# Patient Record
Sex: FEMALE | Race: BLACK OR AFRICAN AMERICAN | NOT HISPANIC OR LATINO | Employment: OTHER | URBAN - METROPOLITAN AREA
[De-identification: names, ages, dates, MRNs, and addresses within clinical notes are randomized per-mention and may not be internally consistent; named-entity substitution may affect disease eponyms.]

---

## 2017-02-09 ENCOUNTER — HOSPITAL ENCOUNTER (EMERGENCY)
Facility: HOSPITAL | Age: 42
Discharge: HOME | End: 2017-02-09
Attending: EMERGENCY MEDICINE
Payer: MEDICAID

## 2017-02-09 VITALS
OXYGEN SATURATION: 98 % | HEIGHT: 64 IN | RESPIRATION RATE: 12 BRPM | WEIGHT: 192 LBS | SYSTOLIC BLOOD PRESSURE: 136 MMHG | TEMPERATURE: 99 F | BODY MASS INDEX: 32.78 KG/M2 | DIASTOLIC BLOOD PRESSURE: 72 MMHG | HEART RATE: 100 BPM

## 2017-02-09 DIAGNOSIS — J06.9 UPPER RESPIRATORY INFECTION, ACUTE: ICD-10-CM

## 2017-02-09 DIAGNOSIS — R50.9 ACUTE FEBRILE ILLNESS: Primary | ICD-10-CM

## 2017-02-09 LAB
B-HCG UR QL: NEGATIVE
CTP QC/QA: YES
DEPRECATED S PYO AG THROAT QL EIA: NEGATIVE
FLUAV AG SPEC QL IA: NEGATIVE
FLUBV AG SPEC QL IA: NEGATIVE
SPECIMEN SOURCE: NORMAL

## 2017-02-09 PROCEDURE — 99283 EMERGENCY DEPT VISIT LOW MDM: CPT | Mod: 25

## 2017-02-09 PROCEDURE — 87081 CULTURE SCREEN ONLY: CPT

## 2017-02-09 PROCEDURE — 87880 STREP A ASSAY W/OPTIC: CPT

## 2017-02-09 PROCEDURE — 81025 URINE PREGNANCY TEST: CPT | Performed by: PHYSICIAN ASSISTANT

## 2017-02-09 PROCEDURE — 87400 INFLUENZA A/B EACH AG IA: CPT | Mod: 59

## 2017-02-09 PROCEDURE — 25000003 PHARM REV CODE 250: Performed by: PHYSICIAN ASSISTANT

## 2017-02-09 RX ORDER — ONDANSETRON 4 MG/1
4 TABLET, ORALLY DISINTEGRATING ORAL
Status: COMPLETED | OUTPATIENT
Start: 2017-02-09 | End: 2017-02-09

## 2017-02-09 RX ORDER — GUAIFENESIN/DEXTROMETHORPHAN 100-10MG/5
5 SYRUP ORAL 4 TIMES DAILY PRN
Qty: 120 ML | Refills: 0 | Status: SHIPPED | OUTPATIENT
Start: 2017-02-09 | End: 2017-02-19

## 2017-02-09 RX ORDER — IBUPROFEN 600 MG/1
600 TABLET ORAL
Status: COMPLETED | OUTPATIENT
Start: 2017-02-09 | End: 2017-02-09

## 2017-02-09 RX ADMIN — IBUPROFEN 600 MG: 600 TABLET, FILM COATED ORAL at 07:02

## 2017-02-09 RX ADMIN — ONDANSETRON 4 MG: 4 TABLET, ORALLY DISINTEGRATING ORAL at 07:02

## 2017-02-09 NOTE — ED AVS SNAPSHOT
[facility] [address] [place] [address]               Joie Kelly   2017  6:28 PM   ED    Description:  Female : 1975   Department:  [facility]           Your Care was Coordinated By:     Provider Role From To    Travis Sapp MD Attending Provider 17 1744 --    Maryann Archuleta PA-C Physician Assistant 17 [phone] --      Reason for Visit     Generalized Body Aches           Diagnoses this Visit        Comments    Acute febrile illness    -  Primary     Upper respiratory infection, acute           ED Disposition     ED Disposition Condition Comment    Discharge             To Do List           Follow-up Information     Follow up with Terry Art Iii, MD In 1 week.    Specialty:  Family Medicine    Contact information:    [address]  [facility] [address]  [phone]          Follow up with [facility].    Specialty:  Emergency Medicine    Why:  If symptoms worsen    Contact information:    [address] [place] [id number] [phone]       These Medications        Disp Refills Start End    dextromethorphan-guaifenesin  mg/5 ml (ROBITUSSIN-DM)  mg/5 mL liquid 120 mL 0 2017    Take 5 mLs by mouth 4 (four) times daily as needed (cough/congestion). - Oral      [facility]     [facility] On [facility] Nurse Care Line -  Assistance  Registered nurses in the [facility] provide clinical advisement, health education, appointment booking, and other advisory services.  Call for this free service at [phone].             Medications           Message regarding Medications     Verify the changes and/or additions to your medication regime listed below are the same as discussed with your clinician today.  If any of these changes or additions are incorrect, please notify your healthcare provider.        START  "taking these NEW medications        Refills    dextromethorphan-guaifenesin  mg/5 ml (ROBITUSSIN-DM)  mg/5 mL liquid 0    Sig: Take 5 mLs by mouth 4 (four) times daily as needed (cough/congestion).    Class: Print    Route: Oral      These medications were administered today        Dose Freq    ibuprofen tablet 600 mg 600 mg ED 1 Time    Sig: Take 1 tablet (600 mg total) by mouth ED 1 Time.    Class: Normal    Route: Oral    ondansetron disintegrating tablet 4 mg 4 mg ED 1 Time    Sig: Take 1 tablet (4 mg total) by mouth ED 1 Time.    Class: Normal    Route: Oral      STOP taking these medications     butalbital-acetaminophen-caffeine -40 mg (FIORICET, ESGIC) -40 mg per tablet Take 1 tablet by mouth every 4 (four) hours as needed for Headaches.           Verify that the below list of medications is an accurate representation of the medications you are currently taking.  If none reported, the list may be blank. If incorrect, please contact your healthcare provider. Carry this list with you in case of emergency.           Current Medications     norethindrone-ethinyl estradiol (ZENCHENT, 28,) 0.4-35 mg-mcg per tablet Take 1 tablet by mouth once daily.    dextromethorphan-guaifenesin  mg/5 ml (ROBITUSSIN-DM)  mg/5 mL liquid Take 5 mLs by mouth 4 (four) times daily as needed (cough/congestion).    hydrocortisone 1 % cream Apply to affected area 2 times daily           Clinical Reference Information           Your Vitals Were     BP Pulse Temp Resp Height Weight    136/72 (BP Location: Right arm, Patient Position: Sitting) 100 102.4 °F (39.1 °C) 12 5' 4" (1.626 m) 87.1 kg (192 lb)    Last Period SpO2 BMI          02/08/2017 98% 32.96 kg/m2        Allergies as of 2/9/2017     No Known Allergies      Immunizations Administered on Date of Encounter - 2/9/2017     None      ED Micro, Lab, POCT     Start Ordered       Status Ordering Provider    02/09/17 1840 02/09/17 1839  Influenza " antigen Nasopharyngeal Swab  STAT      Final result     02/09/17 1840 02/09/17 1839  Rapid strep screen  STAT      Final result     02/09/17 1840 02/09/17 1839  POCT urine pregnancy  Once      Final result     02/09/17 1839 02/09/17 1839  Strep A culture, throat  Once      In process       ED Imaging Orders     None      Discharge References/Attachments     URI, VIRAL, NO ABX (ADULT) (ENGLISH)       [facility] complies with applicable Federal civil rights laws and does not discriminate on the basis of race, color, national origin, age, disability, or sex.        Language Assistance Services     ATTENTION: Language assistance services are available, free of charge. Please call [phone].      ATENCIÓN: Si habla español, tiene a sanders disposición servicios gratuitos de asistencia lingüística. Llame al [phone].     CHÚ Ý: N?u b?n nói Ti?ng Vi?t, có các d?ch v? h? tr? ngôn ng? mi?n phí dành cho b?n. G?i s? [phone].

## 2017-02-10 NOTE — ED PROVIDER NOTES
Encounter Date: 2017       History     Chief Complaint   Patient presents with    Generalized Body Aches     since this am     Review of patient's allergies indicates:  No Known Allergies  HPI Comments: Patient is a 41 year old female with complaint of fever. She reports sudden onset of fever today. She reports taking tylenol this AM. She reports three episodes of vomiting. She reports associated generalized body aches, rhinorrhea, non-productive cough and headache. She denied recent sick contact. She reports headache but denies neck pain or stiffness. She denied abdominal pain, shortness of breath, dysuria, urinary frequency or sore throat.     The history is provided by the patient.     Past Medical History   Diagnosis Date    History of       No past medical history pertinent negatives.  Past Surgical History   Procedure Laterality Date    Tubal ligation       History reviewed. No pertinent family history.  Social History   Substance Use Topics    Smoking status: Never Smoker    Smokeless tobacco: None    Alcohol use No     Review of Systems   Constitutional: Positive for chills and fever. Negative for appetite change.   HENT: Positive for congestion and rhinorrhea. Negative for sore throat.    Respiratory: Positive for cough. Negative for shortness of breath and wheezing.    Cardiovascular: Negative for chest pain.   Gastrointestinal: Positive for nausea and vomiting. Negative for abdominal pain and diarrhea.   Genitourinary: Negative for dysuria and frequency.   Musculoskeletal: Negative for back pain, neck pain and neck stiffness.   Skin: Negative for rash.   Neurological: Positive for headaches. Negative for weakness.   Hematological: Does not bruise/bleed easily.       Physical Exam   Initial Vitals   BP Pulse Resp Temp SpO2   17 1825 17 1825 17 1825 17 1825 17 1825   136/72 122 12 102.4 °F (39.1 °C) 98 %     Physical Exam    Nursing note and vitals  reviewed.  Constitutional: She appears well-developed and well-nourished. No distress.   HENT:   Head: Normocephalic and atraumatic.   Right Ear: Tympanic membrane, external ear and ear canal normal.   Left Ear: Tympanic membrane, external ear and ear canal normal.   Nose: Nose normal.   Mouth/Throat: Uvula is midline and mucous membranes are normal. Posterior oropharyngeal erythema present. No oropharyngeal exudate.   Eyes: Conjunctivae are normal. Pupils are equal, round, and reactive to light. Right eye exhibits no discharge. Left eye exhibits no discharge.   Neck: Normal range of motion. Neck supple.   Cardiovascular: Normal rate, regular rhythm and normal heart sounds. Exam reveals no gallop and no friction rub.    No murmur heard.  Pulmonary/Chest: Effort normal and breath sounds normal. She has no decreased breath sounds. She has no wheezes. She has no rhonchi. She has no rales.   Abdominal: Soft. Bowel sounds are normal. There is no tenderness. There is no guarding.   Musculoskeletal: Normal range of motion.   Neurological: She is alert.   Skin: Skin is warm and dry.         ED Course   Procedures  Labs Reviewed   THROAT SCREEN, RAPID   CULTURE, STREP A,  THROAT   INFLUENZA A AND B ANTIGEN   POCT URINE PREGNANCY             Medical Decision Making:   History:   Old Medical Records: I decided to obtain old medical records.  Clinical Tests:   Lab Tests: Ordered       APC / Resident Notes:   This is an emergent evaluation with 41 no female with complaint of generalized bodyaches, fever, vomiting and rhinorrhea. Vital signs reviewed.  Abdomen is soft and nontender.  There is no rebound, rigidity or distention.  I doubt intra-abdominal process.  Bilateral TMs with no erythema, retraction or perforation.  There is no mastoid tenderness.  There is no movement tenderness to bilateral ears.  No tonsillar swelling or exudate noted.  Uvula is midline.  No concern for ludwigs angina. Breath sounds are clear and equal  bilaterally. Patient was given a dose of ibuprofen with improvement in fever and tachycardia. She does report headache which improved with ibuprofen. She denied nuchal rigidity or neck stiffness. She has full passive ROM to neck. Strep and influenza are negative. Suspect symptoms are secondary to viral illness.  Symptomatic treatment. Discussed results with patient. Return precautions given. Patient is to follow up with their primary care provider. Case was discussed with Dr. Sapp who is in agreement with the plan of care. All questions answered.            Attending Attestation:     Physician Attestation Statement for NP/PA:   I discussed this assessment and plan of this patient with the NP/PA, but I did not personally examine the patient. The face to face encounter was performed by the NP/PA.    Other NP/PA Attestation Additions:    History of Present Illness: 41-year-old female presented with a chief complaint of fever.    Medical Decision Making: Initial differential diagnosis included but not limited to pharyngitis, influenza, and upper respiratory infection.  I am in agreement with the physician assistant's  assessment, treatment, and plan of care.                 ED Course     Clinical Impression:   The primary encounter diagnosis was Acute febrile illness. A diagnosis of Upper respiratory infection, acute was also pertinent to this visit.    Disposition:   Disposition: Discharged  Condition: Stable       Maryann Archuleta PA-C  02/09/17 2029       Travis Sapp MD  02/09/17 2142

## 2017-02-12 LAB — BACTERIA THROAT CULT: NORMAL

## 2017-12-27 ENCOUNTER — HOSPITAL ENCOUNTER (EMERGENCY)
Facility: HOSPITAL | Age: 42
Discharge: HOME | End: 2017-12-27
Attending: EMERGENCY MEDICINE
Payer: MEDICAID

## 2017-12-27 VITALS
RESPIRATION RATE: 12 BRPM | SYSTOLIC BLOOD PRESSURE: 122 MMHG | TEMPERATURE: 99 F | BODY MASS INDEX: 31.16 KG/M2 | DIASTOLIC BLOOD PRESSURE: 63 MMHG | HEIGHT: 65 IN | WEIGHT: 187 LBS | HEART RATE: 80 BPM | OXYGEN SATURATION: 99 %

## 2017-12-27 DIAGNOSIS — B34.9 VIRAL SYNDROME: Primary | ICD-10-CM

## 2017-12-27 DIAGNOSIS — R11.2 NON-INTRACTABLE VOMITING WITH NAUSEA, UNSPECIFIED VOMITING TYPE: ICD-10-CM

## 2017-12-27 LAB
B-HCG UR QL: NEGATIVE
CTP QC/QA: YES
FLUAV AG SPEC QL IA: NEGATIVE
FLUBV AG SPEC QL IA: NEGATIVE
SPECIMEN SOURCE: NORMAL

## 2017-12-27 PROCEDURE — 99283 EMERGENCY DEPT VISIT LOW MDM: CPT | Mod: 25

## 2017-12-27 PROCEDURE — 25000003 PHARM REV CODE 250: Performed by: NURSE PRACTITIONER

## 2017-12-27 PROCEDURE — 81025 URINE PREGNANCY TEST: CPT | Performed by: NURSE PRACTITIONER

## 2017-12-27 PROCEDURE — 87400 INFLUENZA A/B EACH AG IA: CPT

## 2017-12-27 RX ORDER — ONDANSETRON 4 MG/1
4 TABLET, ORALLY DISINTEGRATING ORAL
Status: COMPLETED | OUTPATIENT
Start: 2017-12-27 | End: 2017-12-27

## 2017-12-27 RX ORDER — ONDANSETRON 4 MG/1
4 TABLET, ORALLY DISINTEGRATING ORAL EVERY 8 HOURS PRN
Qty: 12 TABLET | Refills: 0 | Status: SHIPPED | OUTPATIENT
Start: 2017-12-27 | End: 2021-09-07 | Stop reason: CLARIF

## 2017-12-27 RX ADMIN — ONDANSETRON 4 MG: 4 TABLET, ORALLY DISINTEGRATING ORAL at 04:12

## 2017-12-27 NOTE — ED PROVIDER NOTES
Encounter Date: 2017    SCRIBE #1 NOTE: I, Alla Julien, am scribing for, and in the presence of, HUI Tidwell.       History     Chief Complaint   Patient presents with    Emesis     headache, body aches x 3 days.     2017  4:18 PM     Chief Complaint: Emesis      Joie Kelly is a 42 y.o. female presenting to the E.D. with complaints of non bloody emesis which began three days ago. Pt states that she has been unable to tolerate PO intake. Two episodes of emesis thus far today. Associated symptoms include Cough, congestion, rhinorrhea, generalized body aches, and headache. No fever or abdominal pain.       The history is provided by the patient.     Review of patient's allergies indicates:  No Known Allergies  Past Medical History:   Diagnosis Date    History of       Past Surgical History:   Procedure Laterality Date    TUBAL LIGATION       History reviewed. No pertinent family history.  Social History   Substance Use Topics    Smoking status: Never Smoker    Smokeless tobacco: Not on file    Alcohol use No     Review of Systems   Constitutional: Negative for fever.   HENT: Positive for congestion and rhinorrhea. Negative for sore throat.    Eyes: Negative for redness and visual disturbance.   Respiratory: Positive for cough.    Cardiovascular: Negative for chest pain.   Gastrointestinal: Positive for vomiting. Negative for abdominal pain, diarrhea and nausea.   Genitourinary: Negative for difficulty urinating and pelvic pain.   Musculoskeletal: Positive for myalgias. Negative for arthralgias.   Skin: Negative for rash.   Neurological: Positive for headaches. Negative for weakness.       Physical Exam     Initial Vitals [17 1542]   BP Pulse Resp Temp SpO2   122/63 80 12 99.4 °F (37.4 °C) 99 %      MAP       82.67         Physical Exam    Nursing note and vitals reviewed.  Constitutional: She appears well-developed.   HENT:   Head: Normocephalic and atraumatic.    Mouth/Throat: Oropharynx is clear and moist.   Eyes: Conjunctivae are normal.   Neck: Neck supple.   Cardiovascular: Normal rate, regular rhythm, normal heart sounds and intact distal pulses. Exam reveals no gallop and no friction rub.    No murmur heard.  Pulmonary/Chest: Breath sounds normal. She has no wheezes. She has no rhonchi. She has no rales.   Abdominal: Soft. She exhibits no distension. There is no tenderness.   Musculoskeletal: Normal range of motion.   Neurological: She is alert and oriented to person, place, and time.   Skin: No rash noted. No erythema.   Psychiatric: She has a normal mood and affect.         ED Course   Procedures  Labs Reviewed - No data to display                 APC / Resident Notes:   Joie Kelly is a 42 year old female presenting to the ED with c/o emesis and upper respiratory symptoms. She had no abdominal tenderness on exam and was able to tolerate PO intake after receiving oral Zofran. She had no episodes of emesis while in the ED. Lungs clear to auscultation and I do not suspect pneumonia. Symptoms most consistent with a viral syndrome. I do not suspect emergent intraabdominal cause of her vomiting such as appendicitis, SBO, viscus perforation and do not think imaging or labs are necessary. I discussed specific return precautions with the patient and she verbalized understanding. Based on my clinical evaluation, I do not appreciate any immediate, emergent, or life threatening condition or etiology that warrants additional workup today and feel that the patient can be discharged with close follow up care.          Scribe Attestation:   Scribe #1: I performed the above scribed service and the documentation accurately describes the services I performed. I attest to the accuracy of the note.    I, KRISTA ParikhC, personally performed the services described in this documentation. All medical record entries made by the scribe were at my direction and in my presence.   I have reviewed the chart and agree that the record reflects my personal performance and is accurate and complete. HUI Parikh.  6:11 PM 12/27/2017          ED Course      Clinical Impression:   The primary encounter diagnosis was Viral syndrome. A diagnosis of Non-intractable vomiting with nausea, unspecified vomiting type was also pertinent to this visit.    Disposition:   Disposition: Discharged  Condition: Stable                        Sarah Pitt NP  12/27/17 4614

## 2017-12-29 ENCOUNTER — HOSPITAL ENCOUNTER (EMERGENCY)
Facility: HOSPITAL | Age: 42
Discharge: HOME | End: 2017-12-29
Attending: EMERGENCY MEDICINE
Payer: MEDICAID

## 2017-12-29 VITALS
SYSTOLIC BLOOD PRESSURE: 133 MMHG | TEMPERATURE: 99 F | HEIGHT: 65 IN | RESPIRATION RATE: 18 BRPM | OXYGEN SATURATION: 99 % | DIASTOLIC BLOOD PRESSURE: 76 MMHG | WEIGHT: 187 LBS | BODY MASS INDEX: 31.16 KG/M2 | HEART RATE: 70 BPM

## 2017-12-29 DIAGNOSIS — R07.9 CHEST PAIN: ICD-10-CM

## 2017-12-29 DIAGNOSIS — R07.89 CHEST DISCOMFORT: ICD-10-CM

## 2017-12-29 LAB
ALBUMIN SERPL BCP-MCNC: 3.6 G/DL
ALP SERPL-CCNC: 57 U/L
ALT SERPL W/O P-5'-P-CCNC: 10 U/L
ANION GAP SERPL CALC-SCNC: 9 MMOL/L
AST SERPL-CCNC: 12 U/L
BASOPHILS # BLD AUTO: 0 K/UL
BASOPHILS NFR BLD: 0.5 %
BILIRUB SERPL-MCNC: 0.1 MG/DL
BUN SERPL-MCNC: 7 MG/DL
CALCIUM SERPL-MCNC: 9.3 MG/DL
CHLORIDE SERPL-SCNC: 109 MMOL/L
CO2 SERPL-SCNC: 26 MMOL/L
CREAT SERPL-MCNC: 0.7 MG/DL
DIFFERENTIAL METHOD: ABNORMAL
EOSINOPHIL # BLD AUTO: 0.3 K/UL
EOSINOPHIL NFR BLD: 3.6 %
ERYTHROCYTE [DISTWIDTH] IN BLOOD BY AUTOMATED COUNT: 18.2 %
EST. GFR  (AFRICAN AMERICAN): >60 ML/MIN/1.73 M^2
EST. GFR  (NON AFRICAN AMERICAN): >60 ML/MIN/1.73 M^2
FLUAV AG SPEC QL IA: NEGATIVE
FLUBV AG SPEC QL IA: NEGATIVE
GLUCOSE SERPL-MCNC: 93 MG/DL
HCT VFR BLD AUTO: 26.6 %
HGB BLD-MCNC: 8.3 G/DL
LYMPHOCYTES # BLD AUTO: 2.7 K/UL
LYMPHOCYTES NFR BLD: 35.7 %
MCH RBC QN AUTO: 22.5 PG
MCHC RBC AUTO-ENTMCNC: 31.3 G/DL
MCV RBC AUTO: 72 FL
MONOCYTES # BLD AUTO: 0.6 K/UL
MONOCYTES NFR BLD: 7.8 %
NEUTROPHILS # BLD AUTO: 4 K/UL
NEUTROPHILS NFR BLD: 52.4 %
PLATELET # BLD AUTO: 450 K/UL
PMV BLD AUTO: 7.6 FL
POTASSIUM SERPL-SCNC: 3.8 MMOL/L
PROT SERPL-MCNC: 7.8 G/DL
RBC # BLD AUTO: 3.69 M/UL
SODIUM SERPL-SCNC: 144 MMOL/L
SPECIMEN SOURCE: NORMAL
TROPONIN I SERPL DL<=0.01 NG/ML-MCNC: 0.01 NG/ML
WBC # BLD AUTO: 7.7 K/UL

## 2017-12-29 PROCEDURE — 84484 ASSAY OF TROPONIN QUANT: CPT

## 2017-12-29 PROCEDURE — 80053 COMPREHEN METABOLIC PANEL: CPT

## 2017-12-29 PROCEDURE — 99284 EMERGENCY DEPT VISIT MOD MDM: CPT

## 2017-12-29 PROCEDURE — 93005 ELECTROCARDIOGRAM TRACING: CPT

## 2017-12-29 PROCEDURE — 85025 COMPLETE CBC W/AUTO DIFF WBC: CPT

## 2017-12-29 PROCEDURE — 25000003 PHARM REV CODE 250: Performed by: EMERGENCY MEDICINE

## 2017-12-29 PROCEDURE — 36415 COLL VENOUS BLD VENIPUNCTURE: CPT

## 2017-12-29 PROCEDURE — 87400 INFLUENZA A/B EACH AG IA: CPT | Mod: 59

## 2017-12-29 RX ORDER — ASPIRIN 325 MG
325 TABLET ORAL
Status: COMPLETED | OUTPATIENT
Start: 2017-12-29 | End: 2017-12-29

## 2017-12-29 RX ORDER — NAPROXEN 500 MG/1
500 TABLET ORAL 2 TIMES DAILY WITH MEALS
Qty: 14 TABLET | Refills: 0 | Status: SHIPPED | OUTPATIENT
Start: 2017-12-29 | End: 2018-01-05

## 2017-12-29 RX ADMIN — ASPIRIN 325 MG ORAL TABLET 325 MG: 325 PILL ORAL at 08:12

## 2017-12-30 NOTE — ED NOTES
Pt presents to ED with c/o right chest wall pain that began one day ago. She also reports fever and cough. Pt is AAOx4. Skin warm, dry to touch. Respirations even, nonlabored. NAD noted. VSS. Breath sounds clear, equal bilaterally. Mucous membranes pink and moist. The pt had an EKG done in triage that showed NSR with no ectopy.

## 2017-12-30 NOTE — ED PROVIDER NOTES
Encounter Date: 2017    SCRIBE #1 NOTE: I, Kianna Cifuentes , am scribing for, and in the presence of, Dr. Avalos .       History     Chief Complaint   Patient presents with    Fever    Chest Pain    Cough     2017  8:19 PM     Chief Complaint: CP      The patient is a 42 y.o. female who is presenting with the gradual onset of R sided CP that began x 1 day ago. The pt reports that the pain is achy and radiates from the anterior chest wall underneath her R breast and to her back. She notes that the pain is exacerbated with coughing and palpation. No exacerbating or mitigating factors. No treatments attempted. Associated sx include fever that have since resolved as well as persistent nausea. No other comments or complaints. No pertinent PMHx, no pertinent past surgical hx.         The history is provided by the patient and medical records.     Review of patient's allergies indicates:  No Known Allergies  Past Medical History:   Diagnosis Date    History of       Past Surgical History:   Procedure Laterality Date    TUBAL LIGATION       History reviewed. No pertinent family history.  Social History   Substance Use Topics    Smoking status: Never Smoker    Smokeless tobacco: Not on file    Alcohol use No     Review of Systems   Constitutional: Positive for fever (resolved ).   HENT: Negative for sore throat.    Eyes: Negative for visual disturbance.   Respiratory: Negative for shortness of breath.    Cardiovascular: Positive for chest pain.   Gastrointestinal: Positive for nausea.   Genitourinary: Negative for dysuria.   Musculoskeletal: Negative for back pain.   Skin: Negative for rash.   Neurological: Negative for weakness.   Hematological: Does not bruise/bleed easily.       Physical Exam     Initial Vitals [17 1710]   BP Pulse Resp Temp SpO2   (!) 143/73 73 20 99 °F (37.2 °C) 100 %      MAP       96.33         Physical Exam    Nursing note and vitals reviewed.  Constitutional: She  appears well-nourished.   HENT:   Head: Normocephalic and atraumatic.   Eyes: Conjunctivae and EOM are normal.   Neck: Normal range of motion. Neck supple. No thyroid mass present.   Cardiovascular: Normal rate and regular rhythm.   Abdominal: Soft. Normal appearance and bowel sounds are normal. There is no tenderness.   Musculoskeletal: She exhibits tenderness.   Reproducible chest wall tenderness to the R upper anterior chest wall.    Neurological: She is alert and oriented to person, place, and time. She has normal strength. No cranial nerve deficit or sensory deficit.   Skin: Skin is warm and dry. No rash noted. No erythema.   Psychiatric: She has a normal mood and affect. Her speech is normal. Cognition and memory are normal.         ED Course   Procedures  Labs Reviewed   INFLUENZA A AND B ANTIGEN   CBC W/ AUTO DIFFERENTIAL   COMPREHENSIVE METABOLIC PANEL   TROPONIN I             Medical Decision Making:   Initial Assessment:   This is an emergent evaluation for a patient with chest pain. The patient is complaining of chest pain for the past 1 day. The patient's pain is atypical for cardiac etiology.  I decided to obtain and review the patient's past medical record.        The vital signs are stable in the room.    EKG is normal.  There is no evidence of STEMI or ischemia.    CXR is negative for pneumonia, pneumothorax and edema.  Troponin is negative and was drawn at least 8 hours since the onset of pain.  I doubt ACS.  BNP is negative.  There is no evidence of congestive heart failure.  The electrolytes are relatively normal.  The pt is not anemic.  Pt is PERC 0. I doubt PE.     The patient's symptoms were treated with:  ASA    Currently the patient has a non-diagnostic EKG with negative troponin in the emergency department.  I doubt acute coronary syndrome.  I did inform the patient that even with negative testing, we can never eliminate all risk.  I believe the patient is low risk with negative initial  testing; they are appropriate for close outpatient follow-up, possible stress test.  The patient is aware of the small but persistent risk for MI/ACS with subsequent cardiac complications or death.  I have low suspicion for cardiopulmonary, vascular, infectious, respiratory, or other emergent medical condition based on my evaluation in the ED.     The patient's pain is currently improved.      The results and physical exam findings were reviewed with the patient. Patient agrees with assessment, disposition and treatment plan and has no further questions or complaints at this time.                    Scribe Attestation:   Scribe #1: I performed the above scribed service and the documentation accurately describes the services I performed. I attest to the accuracy of the note.    I, Dr. Adarsh Avalos, personally performed the services described in this documentation. All medical record entries made by the scribe were at my direction and in my presence.  I have reviewed the chart and agree that the record reflects my personal performance and is accurate and complete. Adarsh Avalos MD.  8:42 PM 12/29/2017          ED Course      Clinical Impression:   Diagnoses of Chest discomfort and Chest pain were pertinent to this visit.    Disposition:   Disposition: Discharged  Condition: Stable                        Adarsh Avalos MD  01/06/18 0450

## 2018-05-17 ENCOUNTER — HOSPITAL ENCOUNTER (EMERGENCY)
Facility: HOSPITAL | Age: 43
Discharge: HOME | End: 2018-05-17
Attending: EMERGENCY MEDICINE
Payer: MEDICAID

## 2018-05-17 VITALS
TEMPERATURE: 99 F | BODY MASS INDEX: 31.65 KG/M2 | SYSTOLIC BLOOD PRESSURE: 139 MMHG | RESPIRATION RATE: 12 BRPM | DIASTOLIC BLOOD PRESSURE: 73 MMHG | HEART RATE: 93 BPM | HEIGHT: 65 IN | WEIGHT: 190 LBS | OXYGEN SATURATION: 99 %

## 2018-05-17 DIAGNOSIS — B02.9 HERPES ZOSTER WITHOUT COMPLICATION: Primary | ICD-10-CM

## 2018-05-17 PROCEDURE — 99283 EMERGENCY DEPT VISIT LOW MDM: CPT

## 2018-05-17 RX ORDER — IBUPROFEN 800 MG/1
800 TABLET ORAL 3 TIMES DAILY
COMMUNITY
End: 2019-01-11 | Stop reason: ALTCHOICE

## 2018-05-17 RX ORDER — VALACYCLOVIR HYDROCHLORIDE 1 G/1
1000 TABLET, FILM COATED ORAL 3 TIMES DAILY
Qty: 21 TABLET | Refills: 0 | Status: SHIPPED | OUTPATIENT
Start: 2018-05-17 | End: 2021-09-07 | Stop reason: CLARIF

## 2018-05-17 RX ORDER — HYDROCODONE BITARTRATE AND ACETAMINOPHEN 5; 325 MG/1; MG/1
1 TABLET ORAL EVERY 4 HOURS PRN
Qty: 12 TABLET | Refills: 0 | Status: SHIPPED | OUTPATIENT
Start: 2018-05-17 | End: 2018-05-27

## 2018-05-17 NOTE — ED PROVIDER NOTES
Encounter Date: 2018    SCRIBE #1 NOTE: I, Tory Greer , anastasia scribing for, and in the presence of, Lilia Pitt NP.       History     Chief Complaint   Patient presents with    Rash     to abdomen x 5 days s/p ablation done at [facility].        2018 6:44 PM     Chief complaint: Rash       Joie Kelly is a 42 y.o. female with no pertinent PMHx who presents to the ED with c/o painful pruritic rash on left sided abd which wraps to the left back x 5 days. Pt is 1 week s/p endometrial ablation performed at [facility]. She suspect it may be an allergic reaction to medication. NKDA noted.       The history is provided by the patient.     Review of patient's allergies indicates:  No Known Allergies  Past Medical History:   Diagnosis Date    History of       Past Surgical History:   Procedure Laterality Date    TUBAL LIGATION       History reviewed. No pertinent family history.  Social History   Substance Use Topics    Smoking status: Never Smoker    Smokeless tobacco: Not on file    Alcohol use No     Review of Systems   Constitutional: Negative for chills and fever.   HENT: Negative for congestion, rhinorrhea and sore throat.    Eyes: Negative for pain and redness.   Respiratory: Negative for cough and shortness of breath.    Cardiovascular: Negative for chest pain and palpitations.   Gastrointestinal: Negative for abdominal pain, diarrhea and nausea.   Genitourinary: Negative for dysuria, flank pain, frequency, hematuria and urgency.   Musculoskeletal: Negative for gait problem, myalgias and neck pain.   Skin: Positive for rash.   Neurological: Negative for dizziness, light-headedness and headaches.       Physical Exam     Initial Vitals [18 1635]   BP Pulse Resp Temp SpO2   139/73 93 12 99.3 °F (37.4 °C) 99 %      MAP       95         Physical Exam    Nursing note and vitals reviewed.  Constitutional: Vital signs are normal. She appears well-developed and well-nourished. She is  not diaphoretic. She is active. She does not have a sickly appearance. No distress.   HENT:   Head: Normocephalic and atraumatic.   Eyes: Conjunctivae are normal.   Neck: Normal range of motion and full passive range of motion without pain.   Cardiovascular: Normal rate, regular rhythm and normal heart sounds. Exam reveals no gallop and no friction rub.    No murmur heard.  Pulmonary/Chest: Breath sounds normal. She has no wheezes. She has no rhonchi. She has no rales.   Abdominal: Soft. Bowel sounds are normal. There is no tenderness.   Musculoskeletal: Normal range of motion.   Neurological: She is alert. Gait normal.   Skin: Skin is warm and dry. Capillary refill takes less than 2 seconds. Rash noted. Rash is vesicular.   Painful vesicular rash to the left mid abd around to the left middle back, does not cross midline.    Psychiatric: She has a normal mood and affect.                 ED Course   Procedures  Labs Reviewed - No data to display                APC / Resident Notes:   Joie Kelly is a 42 year old female presenting to the ED with a painful, pruritic rash that does not cross the midline. The rash is consistent with herpes zoster and I do not suspect TENS, Orourke Jamil syndrome, or other emergent rash requiring transfer and immediate dermatology consult. She will be treated with antivirals and pain medication. She was advised to follow up with PCP for recheck and was also provided with specific return precautions. She verbalized understanding of all instructions. Based on my clinical evaluation, I do not appreciate any immediate, emergent, or life threatening condition or etiology that warrants additional workup today and feel that the patient can be discharged with close follow up care.          Scribe Attestation:   Scribe #1: I performed the above scribed service and the documentation accurately describes the services I performed. I attest to the accuracy of the note.    I, Lilia Pitt,  NP-C, personally performed the services described in this documentation. All medical record entries made by the scribe were at my direction and in my presence.  I have reviewed the chart and agree that the record reflects my personal performance and is accurate and complete. HUI Parikh.  9:41 PM 05/17/2018             Clinical Impression:     1. Herpes zoster without complication        Disposition:   Disposition: Discharged  Condition: Stable                        Sarah Pitt NP  05/17/18 5578

## 2018-08-30 ENCOUNTER — HOSPITAL ENCOUNTER (EMERGENCY)
Facility: HOSPITAL | Age: 43
Discharge: HOME | End: 2018-08-31
Attending: EMERGENCY MEDICINE
Payer: MEDICAID

## 2018-08-30 DIAGNOSIS — J02.9 VIRAL PHARYNGITIS: Primary | ICD-10-CM

## 2018-08-30 DIAGNOSIS — R07.89 CHEST TIGHTNESS: ICD-10-CM

## 2018-08-30 DIAGNOSIS — G43.109 MIGRAINE WITH AURA AND WITHOUT STATUS MIGRAINOSUS, NOT INTRACTABLE: ICD-10-CM

## 2018-08-30 LAB
ANION GAP SERPL CALC-SCNC: 11 MMOL/L
B-HCG UR QL: NEGATIVE
BASOPHILS # BLD AUTO: 0 K/UL
BASOPHILS NFR BLD: 0 %
BUN SERPL-MCNC: 10 MG/DL
CALCIUM SERPL-MCNC: 9.4 MG/DL
CHLORIDE SERPL-SCNC: 106 MMOL/L
CO2 SERPL-SCNC: 22 MMOL/L
CREAT SERPL-MCNC: 0.8 MG/DL
CTP QC/QA: YES
DEPRECATED S PYO AG THROAT QL EIA: NEGATIVE
DIFFERENTIAL METHOD: ABNORMAL
EOSINOPHIL # BLD AUTO: 0.1 K/UL
EOSINOPHIL NFR BLD: 1.1 %
ERYTHROCYTE [DISTWIDTH] IN BLOOD BY AUTOMATED COUNT: 20.8 %
EST. GFR  (AFRICAN AMERICAN): >60 ML/MIN/1.73 M^2
EST. GFR  (NON AFRICAN AMERICAN): >60 ML/MIN/1.73 M^2
FLUAV AG SPEC QL IA: NEGATIVE
FLUBV AG SPEC QL IA: NEGATIVE
GLUCOSE SERPL-MCNC: 95 MG/DL
HCT VFR BLD AUTO: 29.2 %
HGB BLD-MCNC: 9 G/DL
LYMPHOCYTES # BLD AUTO: 1.1 K/UL
LYMPHOCYTES NFR BLD: 11.7 %
MCH RBC QN AUTO: 21.7 PG
MCHC RBC AUTO-ENTMCNC: 30.7 G/DL
MCV RBC AUTO: 71 FL
MONOCYTES # BLD AUTO: 0.6 K/UL
MONOCYTES NFR BLD: 6.4 %
NEUTROPHILS # BLD AUTO: 7.9 K/UL
NEUTROPHILS NFR BLD: 80.8 %
PLATELET # BLD AUTO: 408 K/UL
PMV BLD AUTO: 8.8 FL
POTASSIUM SERPL-SCNC: 3.5 MMOL/L
RBC # BLD AUTO: 4.12 M/UL
SODIUM SERPL-SCNC: 139 MMOL/L
SPECIMEN SOURCE: NORMAL
WBC # BLD AUTO: 9.8 K/UL

## 2018-08-30 PROCEDURE — 63600175 PHARM REV CODE 636 W HCPCS: Performed by: NURSE PRACTITIONER

## 2018-08-30 PROCEDURE — 81025 URINE PREGNANCY TEST: CPT | Performed by: NURSE PRACTITIONER

## 2018-08-30 PROCEDURE — 85025 COMPLETE CBC W/AUTO DIFF WBC: CPT

## 2018-08-30 PROCEDURE — 99284 EMERGENCY DEPT VISIT MOD MDM: CPT | Mod: 25

## 2018-08-30 PROCEDURE — 93005 ELECTROCARDIOGRAM TRACING: CPT

## 2018-08-30 PROCEDURE — 36415 COLL VENOUS BLD VENIPUNCTURE: CPT

## 2018-08-30 PROCEDURE — 96361 HYDRATE IV INFUSION ADD-ON: CPT

## 2018-08-30 PROCEDURE — 87081 CULTURE SCREEN ONLY: CPT

## 2018-08-30 PROCEDURE — 80048 BASIC METABOLIC PNL TOTAL CA: CPT

## 2018-08-30 PROCEDURE — 87880 STREP A ASSAY W/OPTIC: CPT

## 2018-08-30 PROCEDURE — 96374 THER/PROPH/DIAG INJ IV PUSH: CPT

## 2018-08-30 PROCEDURE — 87400 INFLUENZA A/B EACH AG IA: CPT | Mod: 59

## 2018-08-30 PROCEDURE — 25000003 PHARM REV CODE 250: Performed by: NURSE PRACTITIONER

## 2018-08-30 RX ORDER — KETOROLAC TROMETHAMINE 30 MG/ML
15 INJECTION, SOLUTION INTRAMUSCULAR; INTRAVENOUS
Status: COMPLETED | OUTPATIENT
Start: 2018-08-30 | End: 2018-08-30

## 2018-08-30 RX ORDER — SODIUM CHLORIDE 9 MG/ML
1000 INJECTION, SOLUTION INTRAVENOUS
Status: COMPLETED | OUTPATIENT
Start: 2018-08-30 | End: 2018-08-31

## 2018-08-30 RX ADMIN — SODIUM CHLORIDE 1000 ML: 0.9 INJECTION, SOLUTION INTRAVENOUS at 11:08

## 2018-08-30 RX ADMIN — KETOROLAC TROMETHAMINE 15 MG: 30 INJECTION, SOLUTION INTRAMUSCULAR at 11:08

## 2018-08-31 VITALS
SYSTOLIC BLOOD PRESSURE: 107 MMHG | BODY MASS INDEX: 29.82 KG/M2 | OXYGEN SATURATION: 97 % | DIASTOLIC BLOOD PRESSURE: 58 MMHG | RESPIRATION RATE: 16 BRPM | HEIGHT: 65 IN | WEIGHT: 179 LBS | TEMPERATURE: 100 F | HEART RATE: 98 BPM

## 2018-08-31 PROCEDURE — 25000003 PHARM REV CODE 250: Performed by: NURSE PRACTITIONER

## 2018-08-31 RX ORDER — ACETAMINOPHEN 325 MG/1
650 TABLET ORAL
Status: COMPLETED | OUTPATIENT
Start: 2018-08-31 | End: 2018-08-31

## 2018-08-31 RX ADMIN — ACETAMINOPHEN 650 MG: 325 TABLET, FILM COATED ORAL at 12:08

## 2018-08-31 NOTE — ED PROVIDER NOTES
Encounter Date: 2018       History     Chief Complaint   Patient presents with    Sore Throat     with fever, body aches, nasal congestion and tightness in chest when lying down     Patient is a 43 y.o. female who presents to the ED 2018 with a chief complaint of sore throat and body aches that started yesterday.   Patient also endorses that she has some left-sided chest wall pain only when she turns into her certain position or pushes on her left side.  She denies any cough.  She denies any shortness of breath. She denies any nausea, vomiting, abdominal pain.  She denies any neck pain or stiffness. She states she has been able to tolerate fluids but only minimally as she has pain with swallowing.  She denies any voice changes, drooling, difficulty breathing.  Her past medical history includes tubal ligation, , uterine ablation.              Review of patient's allergies indicates:  No Known Allergies  Past Medical History:   Diagnosis Date    History of       Past Surgical History:   Procedure Laterality Date    TUBAL LIGATION       No family history on file.  Social History     Tobacco Use    Smoking status: Never Smoker   Substance Use Topics    Alcohol use: No    Drug use: Not on file     Review of Systems   Constitutional: Negative for chills and fever.   HENT: Positive for congestion, rhinorrhea, sore throat and trouble swallowing (hurts to swallow). Negative for ear discharge, ear pain, facial swelling, sinus pressure, sinus pain and voice change.    Respiratory: Negative for cough, shortness of breath, wheezing and stridor.    Cardiovascular: Negative for palpitations.        Soreness in chest when moves in certain position     Gastrointestinal: Negative for nausea and vomiting.   Genitourinary: Negative for dysuria.   Musculoskeletal: Negative for back pain, neck pain and neck stiffness.   Skin: Negative for rash.   Allergic/Immunologic: Negative for immunocompromised  state.   Neurological: Negative for weakness.   Hematological: Does not bruise/bleed easily.       Physical Exam     Initial Vitals [08/30/18 2116]   BP Pulse Resp Temp SpO2   127/71 (!) 116 16 (!) 102.2 °F (39 °C) 98 %      MAP       --         Physical Exam    Nursing note and vitals reviewed.  Constitutional: Vital signs are normal. She appears well-developed and well-nourished.   HENT:   Head: Normocephalic and atraumatic.   Right Ear: Tympanic membrane and external ear normal.   Left Ear: Tympanic membrane and external ear normal.   Nose: Nose normal. Right sinus exhibits no maxillary sinus tenderness and no frontal sinus tenderness. Left sinus exhibits no maxillary sinus tenderness and no frontal sinus tenderness.   Mouth/Throat: Uvula is midline and mucous membranes are normal. Posterior oropharyngeal erythema present. No oropharyngeal exudate, posterior oropharyngeal edema or tonsillar abscesses.   Eyes: Pupils are equal, round, and reactive to light.   Neck: Trachea normal, normal range of motion, full passive range of motion without pain and phonation normal. Neck supple. No Brudzinski's sign noted.   Cardiovascular: Regular rhythm, normal heart sounds and intact distal pulses. Tachycardia present.  Exam reveals no gallop and no friction rub.    No murmur heard.  Pulmonary/Chest: Breath sounds normal. She has no wheezes. She has no rhonchi. She has no rales.   Abdominal: Soft. Normal appearance and bowel sounds are normal. She exhibits no distension. There is no tenderness.   Lymphadenopathy:     She has cervical adenopathy.        Right cervical: Superficial cervical adenopathy present.        Left cervical: Superficial cervical adenopathy present.   Neurological: She is alert and oriented to person, place, and time. She has normal strength.   Skin: Skin is warm, dry and intact.   Psychiatric: She has a normal mood and affect. Her speech is normal and behavior is normal.         ED Course    Procedures  Labs Reviewed   CBC W/ AUTO DIFFERENTIAL - Abnormal; Notable for the following components:       Result Value    Hemoglobin 9.0 (*)     Hematocrit 29.2 (*)     MCV 71 (*)     MCH 21.7 (*)     MCHC 30.7 (*)     RDW 20.8 (*)     Platelets 408 (*)     MPV 8.8 (*)     Gran # (ANC) 7.9 (*)     Gran% 80.8 (*)     Lymph% 11.7 (*)     All other components within normal limits   BASIC METABOLIC PANEL - Abnormal; Notable for the following components:    CO2 22 (*)     All other components within normal limits   THROAT SCREEN, RAPID   CULTURE, STREP A,  THROAT   INFLUENZA A AND B ANTIGEN   POCT URINE PREGNANCY          Imaging Results          X-Ray Chest AP Portable (In process)                  Medical Decision Making:   Differential Diagnosis:   Viral pharyngitis  Epiglottis  PTA  Costochondritis  Pleurisy  ACS       APC / Resident Notes:   Patient is a 43 y.o. female who presents to the ED 09/01/2018 who underwent emergent evaluation for sore throat with associated body aches.  Patient is tachycardic.  Physical exam is otherwise unremarkable.  Posterior oropharynx with erythema no exudate. Uvula midline. Positive cervical adenopathy.  The patient's symptoms are consistent with viral pharyngitis.  I do not think the patient has strep pharyngitis based upon the [facility] Criteria and negative rapid strep test in ED.  The patient doesn't appear to have any stridor, drooling, hoarseness, uvular deviation, facial swelling, meningismus to suggest peritonsillar abscess, retropharyngeal abscess, epiglotitis, meningitis, airway compromise.  Influenza testing in the emergency department is negative. I do not think influenza.  Patient has reproducible left-sided chest wall pain.  EKG shows normal sinus rhythm without ST or T-wave abnormalities.  I have low suspicion for ACS.  I do not think PE.  Patient is initial tachycardia is likely secondary to a fever.  Tachycardia is resolved with IV fluid rehydration and  antipyretics.  Patient does not meet sepsis criteria.  Labs noted. No leukocytosis.  Patient is noted to be anemic which appears to be chronic when compared to previous labs.  Patient states she does have chronic anemia but does not take any iron therapy as she was directed.  There are no signs of blood loss.  I do not think emergent blood transfusion or further evaluation is indicated at this time.  Patient will follow up with her PCP regarding anemia.  Patient's neck is supple.  There is no meningismus. I do not think meningitis.  Patient is likely suffering from a viral pharyngitis.  I do not think there is emergent indication for antibiotics at this time. Based on my clinical evaluation, I do not appreciate any immediate, emergent, or life threatening condition or etiology that warrants additional workup today and feel that the patient can be discharged with close follow up care. Case discussed with  who is agreeable to plan of care. Follow up and return precautions discussed; patient verbalized understanding and is agreeable to plan of care. Patient discharged home in stable condition.                           Clinical Impression:   The primary encounter diagnosis was Viral pharyngitis. Diagnoses of Chest tightness and Migraine with aura and without status migrainosus, not intractable were also pertinent to this visit.      Disposition:   Disposition: Discharged  Condition: Stable                        Dolores Jiang NP  09/01/18 5042

## 2018-09-02 LAB — BACTERIA THROAT CULT: NORMAL

## 2019-01-11 ENCOUNTER — HOSPITAL ENCOUNTER (EMERGENCY)
Facility: HOSPITAL | Age: 44
Discharge: HOME | End: 2019-01-11
Attending: EMERGENCY MEDICINE
Payer: MEDICAID

## 2019-01-11 VITALS
SYSTOLIC BLOOD PRESSURE: 128 MMHG | RESPIRATION RATE: 16 BRPM | DIASTOLIC BLOOD PRESSURE: 75 MMHG | OXYGEN SATURATION: 100 % | HEART RATE: 68 BPM | HEIGHT: 65 IN | BODY MASS INDEX: 31.16 KG/M2 | TEMPERATURE: 97 F | WEIGHT: 187 LBS

## 2019-01-11 DIAGNOSIS — S29.012A STRAIN OF THORACIC PARASPINAL MUSCLES EXCLUDING T1 AND T2 LEVELS, INITIAL ENCOUNTER: ICD-10-CM

## 2019-01-11 DIAGNOSIS — S46.912A STRAIN OF LEFT SHOULDER, INITIAL ENCOUNTER: Primary | ICD-10-CM

## 2019-01-11 DIAGNOSIS — R52 PAIN: ICD-10-CM

## 2019-01-11 LAB
B-HCG UR QL: NEGATIVE
CTP QC/QA: YES

## 2019-01-11 PROCEDURE — 96372 THER/PROPH/DIAG INJ SC/IM: CPT

## 2019-01-11 PROCEDURE — 63600175 PHARM REV CODE 636 W HCPCS: Performed by: NURSE PRACTITIONER

## 2019-01-11 PROCEDURE — 81025 URINE PREGNANCY TEST: CPT | Performed by: NURSE PRACTITIONER

## 2019-01-11 PROCEDURE — 99284 EMERGENCY DEPT VISIT MOD MDM: CPT | Mod: 25

## 2019-01-11 RX ORDER — KETOROLAC TROMETHAMINE 30 MG/ML
15 INJECTION, SOLUTION INTRAMUSCULAR; INTRAVENOUS
Status: COMPLETED | OUTPATIENT
Start: 2019-01-11 | End: 2019-01-11

## 2019-01-11 RX ORDER — CYCLOBENZAPRINE HCL 10 MG
10 TABLET ORAL 3 TIMES DAILY PRN
Qty: 15 TABLET | Refills: 0 | Status: SHIPPED | OUTPATIENT
Start: 2019-01-11 | End: 2019-01-16

## 2019-01-11 RX ORDER — ORPHENADRINE CITRATE 30 MG/ML
30 INJECTION INTRAMUSCULAR; INTRAVENOUS
Status: COMPLETED | OUTPATIENT
Start: 2019-01-11 | End: 2019-01-11

## 2019-01-11 RX ORDER — NAPROXEN 500 MG/1
500 TABLET ORAL 2 TIMES DAILY WITH MEALS
Qty: 60 TABLET | Refills: 0 | Status: SHIPPED | OUTPATIENT
Start: 2019-01-11 | End: 2021-09-07 | Stop reason: CLARIF

## 2019-01-11 RX ADMIN — ORPHENADRINE CITRATE 30 MG: 30 INJECTION INTRAMUSCULAR; INTRAVENOUS at 10:01

## 2019-01-11 RX ADMIN — KETOROLAC TROMETHAMINE 15 MG: 30 INJECTION, SOLUTION INTRAMUSCULAR at 10:01

## 2019-01-12 NOTE — ED PROVIDER NOTES
Encounter Date: 2019    SCRIBE #1 NOTE: I, Maryan Holloway, am scribing for, and in the presence of, Dolores Jiang NP .       History     Chief Complaint   Patient presents with    Back Pain     pt reports entire back hurting, with left shoulder pain and right scapula pain started 1 week ago       Time seen by provider: 10:20 PM on 2019    Joie Kelly is a 43 y.o. female with no noted medical history who presents to the ED with back pain. Patient explains for the past 2 weeks she has had pain in her shoulder. Today she began having upper and middle back pain and left shoulder pain that is worse with movement. 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 Patient denies any urinary complaints, chest pain, numbness, weakness, bowel or bladder incontinence, abdominal pain, nausea, vomiting, or fever. Patient denies any hx of cancer or IV drug abuse       The history is provided by the patient. No  was used.     Review of patient's allergies indicates:  No Known Allergies  Past Medical History:   Diagnosis Date    History of       Past Surgical History:   Procedure Laterality Date    TUBAL LIGATION       No family history on file.  Social History     Tobacco Use    Smoking status: Never Smoker   Substance Use Topics    Alcohol use: No    Drug use: Not on file     Review of Systems   Constitutional: Negative for chills and fever.   HENT: Negative for congestion and trouble swallowing.    Eyes: Negative for photophobia and visual disturbance.   Respiratory: Negative for cough, shortness of breath and wheezing.    Cardiovascular: Negative for chest pain.   Gastrointestinal: Negative for abdominal pain, diarrhea and vomiting.   Genitourinary: Negative for dysuria and hematuria.   Musculoskeletal: Positive for back pain and myalgias. Negative for joint swelling, neck pain and neck stiffness.   Skin: Negative for  rash.   Neurological: Negative for syncope and numbness.   Hematological: Does not bruise/bleed easily.   Psychiatric/Behavioral: Negative for confusion.       Physical Exam     Initial Vitals [01/11/19 2054]   BP Pulse Resp Temp SpO2   128/75 68 16 97.4 °F (36.3 °C) 100 %      MAP       --         Physical Exam    Nursing note and vitals reviewed.  Constitutional: Vital signs are normal. She appears well-developed and well-nourished.   HENT:   Head: Normocephalic and atraumatic.   Eyes: Pupils are equal, round, and reactive to light.   Neck: Neck supple.   Cardiovascular: Normal rate, regular rhythm, normal heart sounds and intact distal pulses. Exam reveals no gallop and no friction rub.    No murmur heard.  Pulmonary/Chest: Breath sounds normal. She has no wheezes. She has no rhonchi. She has no rales.   Abdominal: Soft. Normal appearance and bowel sounds are normal. There is no tenderness.   Musculoskeletal: She exhibits tenderness. She exhibits no edema.        Left shoulder: She exhibits decreased range of motion, tenderness and pain. She exhibits no bony tenderness, no swelling, no effusion, no crepitus, no deformity, no laceration, no spasm, normal pulse and normal strength.        Cervical back: Normal.        Thoracic back: She exhibits tenderness (paraspinal). She exhibits normal range of motion, no bony tenderness, no swelling, no edema, no deformity, no laceration, no pain, no spasm and normal pulse.        Lumbar back: Normal. She exhibits no bony tenderness.        Back:    No midline spinous tenderness. Thoracic and upper lumbar paraspinal tenderness. Left deltoid tenderness and posterior AC tenderness; she has decreased active ROM due to pain of the left shoulder; normal passive ROM; there is no swelling or erythema of the joint;  5/5 strength of bi upper extremities with normal sensation    Neurological: She is alert and oriented to person, place, and time. She has normal strength. No cranial nerve  deficit or sensory deficit.   5/5 strength and normal sensation to upper and lower extremities. Normal gait.    Skin: Skin is warm, dry and intact.   Psychiatric: She has a normal mood and affect. Her speech is normal and behavior is normal.         ED Course   Procedures  Labs Reviewed   POCT URINE PREGNANCY          Imaging Results          X-Ray Thoracic Spine AP Lateral (Final result)  Result time 01/11/19 23:18:26    Final result by Hunter Recio MD (01/11/19 23:18:26)                 Impression:      No acute process.      Electronically signed by: Hunter Recio MD  Date:    01/11/2019  Time:    23:18             Narrative:    EXAMINATION:  XR THORACIC SPINE AP LATERAL    CLINICAL HISTORY:  Pain, unspecified    TECHNIQUE:  AP and lateral views of the thoracic spine were performed.    COMPARISON:  None    FINDINGS:  The thoracic alignment is within normal limits.  The vertebral body heights are maintained.  The posterior elements are within normal limits.  There are scattered anterior osteophytosis throughout the thoracic spine.  The intervertebral disc spaces are unremarkable.  The paraspinal soft tissues are within normal limits.  There is no evidence of acute fracture or listhesis of the thoracic spine.                               X-Ray Shoulder Trauma Left (Final result)  Result time 01/11/19 23:10:02    Final result by Hunter Recio MD (01/11/19 23:10:02)                 Impression:      No evidence of a fracture or dislocation of the left shoulder.      Electronically signed by: Hunter Recio MD  Date:    01/11/2019  Time:    23:10             Narrative:    EXAMINATION:  XR SHOULDER TRAUMA 3 VIEW LEFT    CLINICAL HISTORY:  Pain, unspecified    TECHNIQUE:  Three views of the left shoulder were performed.    COMPARISON  None    FINDINGS:  The bone mineralization is within normal limits.  The glenohumeral articulation is maintained.  The acromioclavicular joint is within normal limits.  The coracoclavicular  interval is unremarkable.  There is no evidence of a fracture or dislocation of the left shoulder.    The visualized left hemithorax is unremarkable.  There is no evidence of a pneumothorax.  No airspace opacity is present.                                 Medical Decision Making:   History:   Old Medical Records: I decided to obtain old medical records.  Differential Diagnosis:   Fracture  Bursitis  Muscle strain   Clinical Tests:   Lab Tests: Ordered and Reviewed  Radiological Study: Ordered and Reviewed       APC / Resident Notes:   Patient is a 43 y.o. female who presents to the ED 01/12/2019 who underwent emergent evaluation for back pain and left shoulder pain.  Patient denies any trauma but lifts heavy patients all day.  Patient has decreased range of motion of her left shoulder due to pain. Normal passive range of motion.  She has +2 radial pulse the left upper extremity with no signs of neurovascular compromise.  She has no swelling or erythema of the left shoulder.  I do not think septic joint.  Xray of left shoulder without acute findings; I do not think acute fracture or dislocation.  There is no deformity.  Patient has 5/5 strength normal sensation of the left upper extremity.  Discussed possible arthralgia or bursitis versus ligament injury. I do not think the adhesive capsulitis; the patient given left upper arm sling for comfort as well as anti-inflammatories and referral to Orthopedics followup palpation. His patient has no midline C, T, or L-spine tenderness. She has bilateral thoracic spine paraspinal tenderness. Patient's has 5/5 strength and normal sensation bilateral lower extremities.  She is ambulatory.  She denies any numbness or weakness.  There are no signs of cauda equina.  I do not think acute fracture or spinal cord compression.  X-ray of thoracic spine without acute findings.  Patient likely is musculoskeletal strain.  Patient treated with anti-inflammatories and muscle relaxers in the  emergency department. Based on my clinical evaluation, I do not appreciate any immediate, emergent, or life threatening condition or etiology that warrants additional workup today and feel that the patient can be discharged with close follow up care. Case discussed with Dr. Avalos who is agreeable to plan of care. Follow up and return precautions discussed; patient verbalized understanding and is agreeable to plan of care. Patient discharged home in stable condition.               Scribe Attestation:   Scribe #1: I performed the above scribed service and the documentation accurately describes the services I performed. I attest to the accuracy of the note.    Attending Attestation:           Physician Attestation for Scribe:  Physician Attestation Statement for Scribe #1: I, Dolores Jiang, reviewed documentation, as scribed by in my presence, and it is both accurate and complete.     Comments: I, HUI Blanchard, personally performed the services described in this documentation. All medical record entries made by the scribe were at my direction and in my presence.  I have reviewed the chart and agree that the record reflects my personal performance and is accurate and complete. HUI Blanchard.  2:11 AM 01/12/2019 e               Clinical Impression:   The primary encounter diagnosis was Strain of left shoulder, initial encounter. Diagnoses of Pain and Strain of thoracic paraspinal muscles excluding T1 and T2 levels, initial encounter were also pertinent to this visit.      Disposition:   Disposition: Discharged  Condition: Stable                        Dolores Jiang NP  01/12/19 0212

## 2019-11-16 ENCOUNTER — HOSPITAL ENCOUNTER (EMERGENCY)
Facility: HOSPITAL | Age: 44
Discharge: HOME | End: 2019-11-16
Attending: EMERGENCY MEDICINE

## 2019-11-16 VITALS
WEIGHT: 192 LBS | DIASTOLIC BLOOD PRESSURE: 62 MMHG | OXYGEN SATURATION: 100 % | SYSTOLIC BLOOD PRESSURE: 128 MMHG | BODY MASS INDEX: 32.78 KG/M2 | HEART RATE: 82 BPM | TEMPERATURE: 98 F | HEIGHT: 64 IN | RESPIRATION RATE: 16 BRPM

## 2019-11-16 DIAGNOSIS — S39.012A BACK STRAIN, INITIAL ENCOUNTER: Primary | ICD-10-CM

## 2019-11-16 PROCEDURE — 25000003 PHARM REV CODE 250: Performed by: EMERGENCY MEDICINE

## 2019-11-16 PROCEDURE — 99284 EMERGENCY DEPT VISIT MOD MDM: CPT

## 2019-11-16 RX ORDER — KETOROLAC TROMETHAMINE 10 MG/1
10 TABLET, FILM COATED ORAL
Status: COMPLETED | OUTPATIENT
Start: 2019-11-16 | End: 2019-11-16

## 2019-11-16 RX ORDER — DICLOFENAC SODIUM 50 MG/1
50 TABLET, DELAYED RELEASE ORAL 2 TIMES DAILY PRN
Qty: 30 TABLET | Refills: 0 | Status: SHIPPED | OUTPATIENT
Start: 2019-11-16 | End: 2021-09-07 | Stop reason: CLARIF

## 2019-11-16 RX ORDER — METHOCARBAMOL 500 MG/1
1000 TABLET, FILM COATED ORAL 3 TIMES DAILY
Qty: 30 TABLET | Refills: 0 | Status: SHIPPED | OUTPATIENT
Start: 2019-11-16 | End: 2019-11-21

## 2019-11-16 RX ADMIN — KETOROLAC TROMETHAMINE 10 MG: 10 TABLET, FILM COATED ORAL at 09:11

## 2019-11-16 NOTE — ED PROVIDER NOTES
"Encounter Date: 2019    SCRIBE #1 NOTE: I, Ana Laurablanca Renteria, am scribing for, and in the presence of, Robinson Manning MD.       History     Chief Complaint   Patient presents with    Back Pain     atraumatic LT lower back pain - s/s x 2 days       Time seen by provider: 8:50 AM on 2019    Joie Kelly is a 44 y.o. female with complaints of left lower back pain radiating to the buttocks and thigh. Pain described as sharp and shooting. She denies recent injury or trauma. Pain started x1.5 days ago. She denies history of back pain. 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 Denies hx of IV drug abuse. Denies abdominal pain, numbness, weakness, loss of bladder control, or other associating symptoms. She took ibuprofen yesterday without relief to pain. She has no other medical concerns. No PMHx. SHx includes tubal ligation. NKDA.     The history is provided by the patient.     Review of patient's allergies indicates:  No Known Allergies  Past Medical History:   Diagnosis Date    History of       Past Surgical History:   Procedure Laterality Date    TUBAL LIGATION       No family history on file.  Social History     Tobacco Use    Smoking status: Never Smoker   Substance Use Topics    Alcohol use: No    Drug use: Not on file     Review of Systems   Constitutional: Negative for fever.   Respiratory: Negative for shortness of breath.    Cardiovascular: Negative for chest pain.   Gastrointestinal: Negative for abdominal pain, nausea and vomiting.   Genitourinary: Negative for difficulty urinating.   Musculoskeletal: Positive for back pain. Negative for gait problem, joint swelling, myalgias and neck pain.   Skin: Negative for color change, pallor, rash and wound.   Neurological: Negative for weakness and numbness.   Hematological: Does not bruise/bleed easily.   Psychiatric/Behavioral: The patient is not nervous/anxious.        Physical Exam     Initial Vitals " [11/16/19 0818]   BP Pulse Resp Temp SpO2   128/62 82 16 98.3 °F (36.8 °C) 100 %      MAP       --         Physical Exam    Nursing note and vitals reviewed.  Constitutional: She appears well-developed.   HENT:   Head: Normocephalic and atraumatic.   Eyes: EOM are normal. Pupils are equal, round, and reactive to light.   Neck: Neck supple.   Cardiovascular: Normal rate, regular rhythm, normal heart sounds and intact distal pulses. Exam reveals no gallop and no friction rub.    No murmur heard.  Pulses:       Dorsalis pedis pulses are 2+ on the right side, and 2+ on the left side.        Posterior tibial pulses are 2+ on the right side, and 2+ on the left side.   Pulmonary/Chest: Breath sounds normal. No respiratory distress. She has no decreased breath sounds. She has no wheezes. She has no rhonchi. She has no rales.   Abdominal: Soft. Bowel sounds are normal. She exhibits no distension. There is no tenderness.   Musculoskeletal: Normal range of motion. She exhibits tenderness.   Tenderness to left lumbosacral region. No erythema or crepitus. No midline spinous tenderness. 5/5 strength and sensation to BLE's. 2+ pedal pulses.    Neurological: She is alert and oriented to person, place, and time. She has normal strength. No sensory deficit.   Skin: Skin is warm and dry.   Psychiatric: She has a normal mood and affect.         ED Course   Procedures  Labs Reviewed - No data to display       Imaging Results    None          Medical Decision Making:   History:   Old Medical Records: I decided to obtain old medical records.  Patient appears have a low back strain.  No signs of cauda equina.  No significant lumbar radiculopathy.  I do not think she needs emergent imaging.  No fevers.  Anti-inflammatories and muscle relaxers.  Stable for discharge at this time.            Scribe Attestation:   Scribe #1: I performed the above scribed service and the documentation accurately describes the services I performed. I attest to  the accuracy of the note.      I, Dr. Robinson Arguello personally performed the services described in this documentation. All medical record entries made by the scribe were at my direction and in my presence.  I have reviewed the chart and agree that the record reflects my personal performance and is accurate and complete. Robinson Arguello MD.  1:11 PM 11/17/2019    DISCLAIMER: This note was prepared with Dragon NaturallySpeaking voice recognition transcription software. Garbled syntax, mangled pronouns, and other bizarre constructions may be attributed to that software system                       Clinical Impression:       ICD-10-CM ICD-9-CM   1. Back strain, initial encounter S39.012A 847.9         Disposition:   Disposition: Discharged  Condition: Stable                     Robinson Arguello MD  11/17/19 1311

## 2020-10-20 ENCOUNTER — HOSPITAL ENCOUNTER (EMERGENCY)
Facility: HOSPITAL | Age: 45
Discharge: HOME | End: 2020-10-20
Attending: EMERGENCY MEDICINE
Payer: MEDICAID

## 2020-10-20 VITALS
DIASTOLIC BLOOD PRESSURE: 68 MMHG | OXYGEN SATURATION: 99 % | SYSTOLIC BLOOD PRESSURE: 132 MMHG | BODY MASS INDEX: 36.25 KG/M2 | TEMPERATURE: 98 F | RESPIRATION RATE: 18 BRPM | HEIGHT: 61 IN | WEIGHT: 192 LBS | HEART RATE: 84 BPM

## 2020-10-20 DIAGNOSIS — R07.9 CHEST PAIN: ICD-10-CM

## 2020-10-20 DIAGNOSIS — M54.10 RADICULOPATHY, UNSPECIFIED SPINAL REGION: Primary | ICD-10-CM

## 2020-10-20 LAB
ALBUMIN SERPL BCP-MCNC: 4 G/DL (ref 3.5–5.2)
ALP SERPL-CCNC: 46 U/L (ref 55–135)
ALT SERPL W/O P-5'-P-CCNC: 20 U/L (ref 10–44)
ANION GAP SERPL CALC-SCNC: 10 MMOL/L (ref 8–16)
AST SERPL-CCNC: 16 U/L (ref 10–40)
BASOPHILS # BLD AUTO: 0.08 K/UL (ref 0–0.2)
BASOPHILS NFR BLD: 0.9 % (ref 0–1.9)
BILIRUB SERPL-MCNC: 0.5 MG/DL (ref 0.1–1)
BUN SERPL-MCNC: 9 MG/DL (ref 6–20)
CALCIUM SERPL-MCNC: 9 MG/DL (ref 8.7–10.5)
CHLORIDE SERPL-SCNC: 104 MMOL/L (ref 95–110)
CK MB SERPL-MCNC: 0.8 NG/ML (ref 0.1–6.5)
CK SERPL-CCNC: 80 U/L (ref 20–180)
CO2 SERPL-SCNC: 27 MMOL/L (ref 23–29)
CREAT SERPL-MCNC: 0.7 MG/DL (ref 0.5–1.4)
DIFFERENTIAL METHOD: ABNORMAL
EOSINOPHIL # BLD AUTO: 0.3 K/UL (ref 0–0.5)
EOSINOPHIL NFR BLD: 2.7 % (ref 0–8)
ERYTHROCYTE [DISTWIDTH] IN BLOOD BY AUTOMATED COUNT: 14.1 % (ref 11.5–14.5)
EST. GFR  (AFRICAN AMERICAN): >60 ML/MIN/1.73 M^2
EST. GFR  (NON AFRICAN AMERICAN): >60 ML/MIN/1.73 M^2
GLUCOSE SERPL-MCNC: 95 MG/DL (ref 70–110)
HCT VFR BLD AUTO: 33.4 % (ref 37–48.5)
HGB BLD-MCNC: 10.5 G/DL (ref 12–16)
IMM GRANULOCYTES # BLD AUTO: 0.02 K/UL (ref 0–0.04)
IMM GRANULOCYTES NFR BLD AUTO: 0.2 % (ref 0–0.5)
LYMPHOCYTES # BLD AUTO: 3 K/UL (ref 1–4.8)
LYMPHOCYTES NFR BLD: 31.4 % (ref 18–48)
MAGNESIUM SERPL-MCNC: 2.1 MG/DL (ref 1.6–2.6)
MCH RBC QN AUTO: 27.9 PG (ref 27–31)
MCHC RBC AUTO-ENTMCNC: 31.4 G/DL (ref 32–36)
MCV RBC AUTO: 89 FL (ref 82–98)
MONOCYTES # BLD AUTO: 0.8 K/UL (ref 0.3–1)
MONOCYTES NFR BLD: 8.3 % (ref 4–15)
NEUTROPHILS # BLD AUTO: 5.3 K/UL (ref 1.8–7.7)
NEUTROPHILS NFR BLD: 56.5 % (ref 38–73)
NRBC BLD-RTO: 0 /100 WBC
PLATELET # BLD AUTO: 350 K/UL (ref 150–350)
PMV BLD AUTO: 10.2 FL (ref 9.2–12.9)
POTASSIUM SERPL-SCNC: 3.6 MMOL/L (ref 3.5–5.1)
PROT SERPL-MCNC: 7.5 G/DL (ref 6–8.4)
RBC # BLD AUTO: 3.77 M/UL (ref 4–5.4)
SODIUM SERPL-SCNC: 141 MMOL/L (ref 136–145)
TROPONIN I SERPL DL<=0.01 NG/ML-MCNC: <0.03 NG/ML
WBC # BLD AUTO: 9.39 K/UL (ref 3.9–12.7)

## 2020-10-20 PROCEDURE — 93010 EKG 12-LEAD: ICD-10-PCS | Mod: ,,, | Performed by: INTERNAL MEDICINE

## 2020-10-20 PROCEDURE — 99285 EMERGENCY DEPT VISIT HI MDM: CPT | Mod: 25

## 2020-10-20 PROCEDURE — 84484 ASSAY OF TROPONIN QUANT: CPT

## 2020-10-20 PROCEDURE — 80053 COMPREHEN METABOLIC PANEL: CPT

## 2020-10-20 PROCEDURE — 85025 COMPLETE CBC W/AUTO DIFF WBC: CPT

## 2020-10-20 PROCEDURE — 96372 THER/PROPH/DIAG INJ SC/IM: CPT | Mod: 59

## 2020-10-20 PROCEDURE — 83735 ASSAY OF MAGNESIUM: CPT

## 2020-10-20 PROCEDURE — 82550 ASSAY OF CK (CPK): CPT

## 2020-10-20 PROCEDURE — 63600175 PHARM REV CODE 636 W HCPCS: Performed by: EMERGENCY MEDICINE

## 2020-10-20 PROCEDURE — 25000003 PHARM REV CODE 250: Performed by: EMERGENCY MEDICINE

## 2020-10-20 PROCEDURE — 36415 COLL VENOUS BLD VENIPUNCTURE: CPT

## 2020-10-20 PROCEDURE — 82553 CREATINE MB FRACTION: CPT

## 2020-10-20 PROCEDURE — 93010 ELECTROCARDIOGRAM REPORT: CPT | Mod: ,,, | Performed by: INTERNAL MEDICINE

## 2020-10-20 PROCEDURE — 93005 ELECTROCARDIOGRAM TRACING: CPT | Performed by: INTERNAL MEDICINE

## 2020-10-20 RX ORDER — METHOCARBAMOL 500 MG/1
1000 TABLET, FILM COATED ORAL 3 TIMES DAILY PRN
Qty: 30 TABLET | Refills: 0 | Status: SHIPPED | OUTPATIENT
Start: 2020-10-20 | End: 2020-10-25

## 2020-10-20 RX ORDER — KETOROLAC TROMETHAMINE 30 MG/ML
30 INJECTION, SOLUTION INTRAMUSCULAR; INTRAVENOUS
Status: COMPLETED | OUTPATIENT
Start: 2020-10-20 | End: 2020-10-20

## 2020-10-20 RX ORDER — ASPIRIN 325 MG
325 TABLET ORAL
Status: COMPLETED | OUTPATIENT
Start: 2020-10-20 | End: 2020-10-20

## 2020-10-20 RX ADMIN — ASPIRIN 325 MG ORAL TABLET 325 MG: 325 PILL ORAL at 10:10

## 2020-10-20 RX ADMIN — KETOROLAC TROMETHAMINE 30 MG: 30 INJECTION, SOLUTION INTRAMUSCULAR; INTRAVENOUS at 11:10

## 2020-10-21 NOTE — ED NOTES
"Pt given prescriptions and discharge instructions with emphasis on follow up and "get prompt medical attention if", pt verbalized understanding. VSS, ABCs intact, A&Ox3. Wheelchair offered for discharge, pt refused.    "

## 2020-10-21 NOTE — ED PROVIDER NOTES
"Encounter Date: 10/20/2020       History     Chief Complaint   Patient presents with    Chest Pain     "off and on for three days"      Patient with no significant past medical history.  Patient reports for the last 3 days she has been having muscle spasm to her left neck.  She has been using lidocaine patches with some relief.  Pain radiates to 4th and 5th fingers.  Pain is worse with sleeping.  Pain also radiates to left upper chest area.  There is no shortness of breath.  No pleurisy hemoptysis.  No relieving factors at home.  No similar symptoms in the past.        Review of patient's allergies indicates:  No Known Allergies  Past Medical History:   Diagnosis Date    History of       Past Surgical History:   Procedure Laterality Date    TUBAL LIGATION       No family history on file.  Social History     Tobacco Use    Smoking status: Never Smoker   Substance Use Topics    Alcohol use: No    Drug use: Not on file     Review of Systems   Constitutional: Negative for chills and fever.   HENT: Negative for congestion.    Eyes: Negative for visual disturbance.   Respiratory: Negative for shortness of breath.    Cardiovascular: Positive for chest pain. Negative for palpitations.   Gastrointestinal: Negative for abdominal pain and vomiting.   Genitourinary: Negative for dysuria.   Musculoskeletal: Negative for joint swelling.   Neurological: Negative for headaches.   Psychiatric/Behavioral: Negative for confusion.       Physical Exam     Initial Vitals [10/20/20 2024]   BP Pulse Resp Temp SpO2   (!) 158/77 80 16 98 °F (36.7 °C) 100 %      MAP       --         Physical Exam    Nursing note and vitals reviewed.  Constitutional: She is not diaphoretic. No distress.   HENT:   Head: Normocephalic and atraumatic.   Eyes: Conjunctivae are normal.   Neck: Neck supple.   Good range of motion neck.  Left trapezius with palpable spasm.  Palpation of trapezius reproduces pain exactly.   Cardiovascular: Normal rate. "   Pulmonary/Chest: Breath sounds normal.   Abdominal: Soft. There is no abdominal tenderness.   Musculoskeletal: Normal range of motion.      Comments: Bilateral upper extremities with good strength and sensation.  2+ bilateral radial pulses.  No arm swelling.   Lymphadenopathy:     She has no cervical adenopathy.   Neurological: She is alert.   No gross deficits   Skin: No rash noted.   Psychiatric: She has a normal mood and affect.         ED Course   Procedures  Labs Reviewed   CBC W/ AUTO DIFFERENTIAL - Abnormal; Notable for the following components:       Result Value    RBC 3.77 (*)     Hemoglobin 10.5 (*)     Hematocrit 33.4 (*)     Mean Corpuscular Hemoglobin Conc 31.4 (*)     All other components within normal limits   COMPREHENSIVE METABOLIC PANEL - Abnormal; Notable for the following components:    Alkaline Phosphatase 46 (*)     All other components within normal limits   TROPONIN I   CK-MB   CK   MAGNESIUM          Imaging Results          X-Ray Chest AP Portable (Preliminary result)  Result time 10/20/20 22:31:30    ED Interpretation by John Motta MD (10/20/20 22:31:30, [facility], Emergency Medicine)    No acute cardiopulmonary process                               Medical Decision Making:   History:   Old Medical Records: I decided to obtain old medical records.  Clinical Tests:   Lab Tests: Reviewed  Radiological Study: Reviewed  Medical Tests: Reviewed  ED Management:  Patient presents with history and physical consistent with cervical radiculopathy with trapezius spasm.  Given some vague upper chest pain cardiac workup initiated.  EKG with no acute ST segment changes.  Troponins unremarkable and is patient with pain for 3 days.  Will refer to [facility].  Begin Robaxin.                             Clinical Impression:       ICD-10-CM ICD-9-CM   1. Radiculopathy, unspecified spinal region  M54.10 729.2   2. Chest pain  R07.9 786.50                          ED  Disposition Condition    Discharge Stable        ED Prescriptions     Medication Sig Dispense Start Date End Date Auth. Provider    methocarbamoL (ROBAXIN) 500 MG Tab Take 2 tablets (1,000 mg total) by mouth 3 (three) times daily as needed. 30 tablet 10/20/2020 10/25/2020 John Motta MD        Follow-up Information     Follow up With Specialties Details Why Contact Info Additional Information    [facility]  If symptoms worsen [address] [address]  [phone] 1st floor    Terry Art III, MD Family Medicine Schedule an appointment as soon as possible for a visit   [address]  [address]  [facility] [facility] [address]  [phone]                                          John Motta MD  10/20/20 9502

## 2020-10-21 NOTE — ED NOTES
First encounter with pt. Pt AxOx3. Pt cc of chest pain. Pt states cp has been intermittent the pat 3days until today has been constant. Pt notes pain is about 5/10 as feels like a tightness. Pt also states L arm feels tingly. Sensation equal. Pt denies, dizziness, SOB, or palpitations. Awaiting further orders. Safety precautions in place. Call light within reach. Will continue to provide care accordingly.

## 2021-07-13 ENCOUNTER — HOSPITAL ENCOUNTER (EMERGENCY)
Facility: HOSPITAL | Age: 46
Discharge: HOME | End: 2021-07-13
Attending: EMERGENCY MEDICINE
Payer: MEDICAID

## 2021-07-13 VITALS
RESPIRATION RATE: 20 BRPM | OXYGEN SATURATION: 94 % | BODY MASS INDEX: 38.68 KG/M2 | HEART RATE: 96 BPM | DIASTOLIC BLOOD PRESSURE: 53 MMHG | HEIGHT: 60 IN | WEIGHT: 197 LBS | TEMPERATURE: 100 F | SYSTOLIC BLOOD PRESSURE: 112 MMHG

## 2021-07-13 DIAGNOSIS — N30.90 CYSTITIS: Primary | ICD-10-CM

## 2021-07-13 LAB
ALBUMIN SERPL BCP-MCNC: 4.2 G/DL (ref 3.5–5.2)
ALP SERPL-CCNC: 64 U/L (ref 55–135)
ALT SERPL W/O P-5'-P-CCNC: 22 U/L (ref 10–44)
ANION GAP SERPL CALC-SCNC: 10 MMOL/L (ref 8–16)
AST SERPL-CCNC: 15 U/L (ref 10–40)
BACTERIA #/AREA URNS HPF: ABNORMAL /HPF
BASOPHILS # BLD AUTO: 0.04 K/UL (ref 0–0.2)
BASOPHILS NFR BLD: 0.5 % (ref 0–1.9)
BILIRUB SERPL-MCNC: 0.5 MG/DL (ref 0.1–1)
BILIRUB UR QL STRIP: NEGATIVE
BUN SERPL-MCNC: 8 MG/DL (ref 6–20)
CALCIUM SERPL-MCNC: 10.2 MG/DL (ref 8.7–10.5)
CHLORIDE SERPL-SCNC: 104 MMOL/L (ref 95–110)
CLARITY UR: CLEAR
CO2 SERPL-SCNC: 27 MMOL/L (ref 23–29)
COLOR UR: YELLOW
CREAT SERPL-MCNC: 0.8 MG/DL (ref 0.5–1.4)
DIFFERENTIAL METHOD: ABNORMAL
EOSINOPHIL # BLD AUTO: 0 K/UL (ref 0–0.5)
EOSINOPHIL NFR BLD: 0.5 % (ref 0–8)
ERYTHROCYTE [DISTWIDTH] IN BLOOD BY AUTOMATED COUNT: 13.7 % (ref 11.5–14.5)
EST. GFR  (AFRICAN AMERICAN): >60 ML/MIN/1.73 M^2
EST. GFR  (NON AFRICAN AMERICAN): >60 ML/MIN/1.73 M^2
GLUCOSE SERPL-MCNC: 106 MG/DL (ref 70–110)
GLUCOSE UR QL STRIP: NEGATIVE
HCT VFR BLD AUTO: 34.3 % (ref 37–48.5)
HGB BLD-MCNC: 10.9 G/DL (ref 12–16)
HGB UR QL STRIP: ABNORMAL
IMM GRANULOCYTES # BLD AUTO: 0.03 K/UL (ref 0–0.04)
IMM GRANULOCYTES NFR BLD AUTO: 0.4 % (ref 0–0.5)
KETONES UR QL STRIP: NEGATIVE
LEUKOCYTE ESTERASE UR QL STRIP: ABNORMAL
LYMPHOCYTES # BLD AUTO: 0.9 K/UL (ref 1–4.8)
LYMPHOCYTES NFR BLD: 11.2 % (ref 18–48)
MCH RBC QN AUTO: 28.2 PG (ref 27–31)
MCHC RBC AUTO-ENTMCNC: 31.8 G/DL (ref 32–36)
MCV RBC AUTO: 89 FL (ref 82–98)
MICROSCOPIC COMMENT: ABNORMAL
MONOCYTES # BLD AUTO: 0.4 K/UL (ref 0.3–1)
MONOCYTES NFR BLD: 5.2 % (ref 4–15)
NEUTROPHILS # BLD AUTO: 6.7 K/UL (ref 1.8–7.7)
NEUTROPHILS NFR BLD: 82.2 % (ref 38–73)
NITRITE UR QL STRIP: POSITIVE
NRBC BLD-RTO: 0 /100 WBC
PH UR STRIP: 7 [PH] (ref 5–8)
PLATELET # BLD AUTO: 306 K/UL (ref 150–450)
PMV BLD AUTO: 9.8 FL (ref 9.2–12.9)
POTASSIUM SERPL-SCNC: 3.7 MMOL/L (ref 3.5–5.1)
PROT SERPL-MCNC: 8.1 G/DL (ref 6–8.4)
PROT UR QL STRIP: NEGATIVE
RBC # BLD AUTO: 3.86 M/UL (ref 4–5.4)
RBC #/AREA URNS HPF: 4 /HPF (ref 0–4)
SARS-COV-2 RDRP RESP QL NAA+PROBE: NEGATIVE
SODIUM SERPL-SCNC: 141 MMOL/L (ref 136–145)
SP GR UR STRIP: 1.02 (ref 1–1.03)
SQUAMOUS #/AREA URNS HPF: 16 /HPF
URN SPEC COLLECT METH UR: ABNORMAL
UROBILINOGEN UR STRIP-ACNC: 1 EU/DL
WBC # BLD AUTO: 8.09 K/UL (ref 3.9–12.7)
WBC #/AREA URNS HPF: 58 /HPF (ref 0–5)

## 2021-07-13 PROCEDURE — 87186 SC STD MICRODIL/AGAR DIL: CPT | Performed by: EMERGENCY MEDICINE

## 2021-07-13 PROCEDURE — 87088 URINE BACTERIA CULTURE: CPT | Performed by: EMERGENCY MEDICINE

## 2021-07-13 PROCEDURE — 87077 CULTURE AEROBIC IDENTIFY: CPT | Performed by: EMERGENCY MEDICINE

## 2021-07-13 PROCEDURE — 80053 COMPREHEN METABOLIC PANEL: CPT | Performed by: EMERGENCY MEDICINE

## 2021-07-13 PROCEDURE — 99284 EMERGENCY DEPT VISIT MOD MDM: CPT | Mod: 25

## 2021-07-13 PROCEDURE — 87086 URINE CULTURE/COLONY COUNT: CPT | Performed by: EMERGENCY MEDICINE

## 2021-07-13 PROCEDURE — 63600175 PHARM REV CODE 636 W HCPCS: Performed by: EMERGENCY MEDICINE

## 2021-07-13 PROCEDURE — 85025 COMPLETE CBC W/AUTO DIFF WBC: CPT | Performed by: EMERGENCY MEDICINE

## 2021-07-13 PROCEDURE — 81000 URINALYSIS NONAUTO W/SCOPE: CPT | Performed by: EMERGENCY MEDICINE

## 2021-07-13 PROCEDURE — 96365 THER/PROPH/DIAG IV INF INIT: CPT

## 2021-07-13 PROCEDURE — U0002 COVID-19 LAB TEST NON-CDC: HCPCS | Performed by: EMERGENCY MEDICINE

## 2021-07-13 PROCEDURE — 25000003 PHARM REV CODE 250: Performed by: EMERGENCY MEDICINE

## 2021-07-13 RX ORDER — AMOXICILLIN AND CLAVULANATE POTASSIUM 875; 125 MG/1; MG/1
1 TABLET, FILM COATED ORAL 2 TIMES DAILY
Qty: 14 TABLET | Refills: 0 | Status: SHIPPED | OUTPATIENT
Start: 2021-07-13 | End: 2021-09-07 | Stop reason: CLARIF

## 2021-07-13 RX ORDER — IBUPROFEN 600 MG/1
600 TABLET ORAL
Status: COMPLETED | OUTPATIENT
Start: 2021-07-13 | End: 2021-07-13

## 2021-07-13 RX ADMIN — IBUPROFEN 600 MG: 600 TABLET, FILM COATED ORAL at 09:07

## 2021-07-13 RX ADMIN — CEFTRIAXONE 1 G: 1 INJECTION, SOLUTION INTRAVENOUS at 10:07

## 2021-07-16 LAB — BACTERIA UR CULT: ABNORMAL

## 2021-09-07 ENCOUNTER — HOSPITAL ENCOUNTER (EMERGENCY)
Facility: HOSPITAL | Age: 46
Discharge: HOME | End: 2021-09-07
Attending: EMERGENCY MEDICINE
Payer: MEDICAID

## 2021-09-07 VITALS
WEIGHT: 201 LBS | BODY MASS INDEX: 37.95 KG/M2 | DIASTOLIC BLOOD PRESSURE: 75 MMHG | TEMPERATURE: 99 F | HEART RATE: 68 BPM | RESPIRATION RATE: 16 BRPM | HEIGHT: 61 IN | OXYGEN SATURATION: 98 % | SYSTOLIC BLOOD PRESSURE: 131 MMHG

## 2021-09-07 DIAGNOSIS — R07.9 CHEST PAIN: ICD-10-CM

## 2021-09-07 DIAGNOSIS — R07.89 ATYPICAL CHEST PAIN: Primary | ICD-10-CM

## 2021-09-07 LAB
ALBUMIN SERPL BCP-MCNC: 4.3 G/DL (ref 3.5–5.2)
ALP SERPL-CCNC: 59 U/L (ref 55–135)
ALT SERPL W/O P-5'-P-CCNC: 23 U/L (ref 10–44)
ANION GAP SERPL CALC-SCNC: 12 MMOL/L (ref 8–16)
AST SERPL-CCNC: 16 U/L (ref 10–40)
BASOPHILS # BLD AUTO: 0.07 K/UL (ref 0–0.2)
BASOPHILS NFR BLD: 0.7 % (ref 0–1.9)
BILIRUB SERPL-MCNC: 0.2 MG/DL (ref 0.1–1)
BUN SERPL-MCNC: 11 MG/DL (ref 6–20)
CALCIUM SERPL-MCNC: 10 MG/DL (ref 8.7–10.5)
CHLORIDE SERPL-SCNC: 106 MMOL/L (ref 95–110)
CO2 SERPL-SCNC: 25 MMOL/L (ref 23–29)
CREAT SERPL-MCNC: 0.8 MG/DL (ref 0.5–1.4)
D DIMER PPP IA.FEU-MCNC: 0.21 MG/L FEU
DIFFERENTIAL METHOD: ABNORMAL
EOSINOPHIL # BLD AUTO: 0.2 K/UL (ref 0–0.5)
EOSINOPHIL NFR BLD: 2.3 % (ref 0–8)
ERYTHROCYTE [DISTWIDTH] IN BLOOD BY AUTOMATED COUNT: 14.2 % (ref 11.5–14.5)
EST. GFR  (AFRICAN AMERICAN): >60 ML/MIN/1.73 M^2
EST. GFR  (NON AFRICAN AMERICAN): >60 ML/MIN/1.73 M^2
GLUCOSE SERPL-MCNC: 89 MG/DL (ref 70–110)
HCT VFR BLD AUTO: 35.5 % (ref 37–48.5)
HGB BLD-MCNC: 11 G/DL (ref 12–16)
IMM GRANULOCYTES # BLD AUTO: 0.03 K/UL (ref 0–0.04)
IMM GRANULOCYTES NFR BLD AUTO: 0.3 % (ref 0–0.5)
LYMPHOCYTES # BLD AUTO: 3.1 K/UL (ref 1–4.8)
LYMPHOCYTES NFR BLD: 30.2 % (ref 18–48)
MCH RBC QN AUTO: 27.8 PG (ref 27–31)
MCHC RBC AUTO-ENTMCNC: 31 G/DL (ref 32–36)
MCV RBC AUTO: 90 FL (ref 82–98)
MONOCYTES # BLD AUTO: 0.9 K/UL (ref 0.3–1)
MONOCYTES NFR BLD: 8.7 % (ref 4–15)
NEUTROPHILS # BLD AUTO: 6 K/UL (ref 1.8–7.7)
NEUTROPHILS NFR BLD: 57.8 % (ref 38–73)
NRBC BLD-RTO: 0 /100 WBC
PLATELET # BLD AUTO: 335 K/UL (ref 150–450)
PMV BLD AUTO: 10.2 FL (ref 9.2–12.9)
POTASSIUM SERPL-SCNC: 3.9 MMOL/L (ref 3.5–5.1)
PROT SERPL-MCNC: 8.1 G/DL (ref 6–8.4)
RBC # BLD AUTO: 3.96 M/UL (ref 4–5.4)
SODIUM SERPL-SCNC: 143 MMOL/L (ref 136–145)
TROPONIN I SERPL DL<=0.01 NG/ML-MCNC: <0.006 NG/ML (ref 0–0.03)
WBC # BLD AUTO: 10.35 K/UL (ref 3.9–12.7)

## 2021-09-07 PROCEDURE — 25000003 PHARM REV CODE 250: Performed by: EMERGENCY MEDICINE

## 2021-09-07 PROCEDURE — 99285 EMERGENCY DEPT VISIT HI MDM: CPT | Mod: 25

## 2021-09-07 PROCEDURE — 93005 ELECTROCARDIOGRAM TRACING: CPT

## 2021-09-07 PROCEDURE — 85379 FIBRIN DEGRADATION QUANT: CPT | Performed by: EMERGENCY MEDICINE

## 2021-09-07 PROCEDURE — 85025 COMPLETE CBC W/AUTO DIFF WBC: CPT | Performed by: EMERGENCY MEDICINE

## 2021-09-07 PROCEDURE — 80053 COMPREHEN METABOLIC PANEL: CPT | Performed by: EMERGENCY MEDICINE

## 2021-09-07 PROCEDURE — 84484 ASSAY OF TROPONIN QUANT: CPT | Performed by: EMERGENCY MEDICINE

## 2021-09-07 RX ORDER — ACETAMINOPHEN 500 MG
500 TABLET ORAL EVERY 6 HOURS PRN
COMMUNITY
End: 2022-07-08

## 2021-09-07 RX ORDER — ASPIRIN 325 MG
325 TABLET ORAL
Status: COMPLETED | OUTPATIENT
Start: 2021-09-07 | End: 2021-09-07

## 2021-09-07 RX ORDER — OMEPRAZOLE 40 MG/1
40 CAPSULE, DELAYED RELEASE ORAL DAILY PRN
COMMUNITY

## 2021-09-07 RX ADMIN — ASPIRIN 325 MG ORAL TABLET 325 MG: 325 PILL ORAL at 07:09

## 2021-09-09 ENCOUNTER — PATIENT OUTREACH (OUTPATIENT)
Dept: EMERGENCY MEDICINE | Facility: HOSPITAL | Age: 46
End: 2021-09-09

## 2021-09-24 ENCOUNTER — PATIENT OUTREACH (OUTPATIENT)
Dept: EMERGENCY MEDICINE | Facility: HOSPITAL | Age: 46
End: 2021-09-24

## 2021-09-30 ENCOUNTER — PATIENT OUTREACH (OUTPATIENT)
Dept: EMERGENCY MEDICINE | Facility: HOSPITAL | Age: 46
End: 2021-09-30

## 2021-10-07 DIAGNOSIS — Z82.49 FAMILY HISTORY OF ANEURYSM: ICD-10-CM

## 2021-10-07 DIAGNOSIS — G43.709 CHRONIC MIGRAINE W/O AURA W/O STATUS MIGRAINOSUS, NOT INTRACTABLE: Primary | ICD-10-CM

## 2021-10-14 ENCOUNTER — HOSPITAL ENCOUNTER (OUTPATIENT)
Dept: RADIOLOGY | Facility: HOSPITAL | Age: 46
Discharge: HOME | End: 2021-10-14
Attending: NURSE PRACTITIONER
Payer: MEDICAID

## 2021-10-14 DIAGNOSIS — G43.709 CHRONIC MIGRAINE W/O AURA W/O STATUS MIGRAINOSUS, NOT INTRACTABLE: ICD-10-CM

## 2021-10-14 DIAGNOSIS — Z82.49 FAMILY HISTORY OF ANEURYSM: ICD-10-CM

## 2021-10-14 PROCEDURE — 70544 MR ANGIOGRAPHY HEAD W/O DYE: CPT | Mod: TC,PO,59

## 2021-10-14 PROCEDURE — 70551 MRI BRAIN STEM W/O DYE: CPT | Mod: TC,PO

## 2021-10-28 ENCOUNTER — PATIENT OUTREACH (OUTPATIENT)
Dept: EMERGENCY MEDICINE | Facility: HOSPITAL | Age: 46
End: 2021-10-28
Payer: MEDICAID

## 2021-11-05 DIAGNOSIS — Z82.49 FAMILY HISTORY OF ANEURYSM: Primary | ICD-10-CM

## 2021-11-05 DIAGNOSIS — I67.1 ANEURYSM OF ANTERIOR CEREBRAL ARTERY: ICD-10-CM

## 2021-11-08 DIAGNOSIS — Z82.49 FAMILY HISTORY OF ISCHEMIC HEART DISEASE: Primary | ICD-10-CM

## 2021-11-08 DIAGNOSIS — I67.1 CEREBRAL ANEURYSM, NONRUPTURED: ICD-10-CM

## 2021-11-18 ENCOUNTER — HOSPITAL ENCOUNTER (OUTPATIENT)
Dept: RADIOLOGY | Facility: HOSPITAL | Age: 46
Discharge: HOME | End: 2021-11-18
Attending: NURSE PRACTITIONER
Payer: MEDICAID

## 2021-11-18 DIAGNOSIS — I67.1 CEREBRAL ANEURYSM, NONRUPTURED: ICD-10-CM

## 2021-11-18 DIAGNOSIS — Z82.49 FAMILY HISTORY OF ISCHEMIC HEART DISEASE: ICD-10-CM

## 2021-11-18 PROCEDURE — 70496 CT ANGIOGRAPHY HEAD: CPT | Mod: TC,PO

## 2021-11-18 PROCEDURE — 25500020 PHARM REV CODE 255: Mod: PO | Performed by: NURSE PRACTITIONER

## 2021-11-18 RX ADMIN — IOHEXOL 100 ML: 350 INJECTION, SOLUTION INTRAVENOUS at 11:11

## 2021-11-24 ENCOUNTER — PATIENT OUTREACH (OUTPATIENT)
Dept: EMERGENCY MEDICINE | Facility: HOSPITAL | Age: 46
End: 2021-11-24
Payer: MEDICAID

## 2021-12-08 ENCOUNTER — PATIENT OUTREACH (OUTPATIENT)
Dept: EMERGENCY MEDICINE | Facility: HOSPITAL | Age: 46
End: 2021-12-08
Payer: MEDICAID

## 2021-12-22 ENCOUNTER — PATIENT OUTREACH (OUTPATIENT)
Dept: EMERGENCY MEDICINE | Facility: HOSPITAL | Age: 46
End: 2021-12-22
Payer: MEDICAID

## 2022-02-24 ENCOUNTER — OFFICE VISIT (OUTPATIENT)
Dept: URGENT CARE | Facility: CLINIC | Age: 47
End: 2022-02-24
Payer: MEDICAID

## 2022-02-24 VITALS
TEMPERATURE: 98 F | WEIGHT: 210 LBS | BODY MASS INDEX: 35.85 KG/M2 | HEART RATE: 84 BPM | RESPIRATION RATE: 16 BRPM | HEIGHT: 64 IN | SYSTOLIC BLOOD PRESSURE: 125 MMHG | OXYGEN SATURATION: 98 % | DIASTOLIC BLOOD PRESSURE: 79 MMHG

## 2022-02-24 DIAGNOSIS — Z20.822 ENCOUNTER FOR LABORATORY TESTING FOR COVID-19 VIRUS: Primary | ICD-10-CM

## 2022-02-24 LAB
CTP QC/QA: YES
SARS-COV-2 RDRP RESP QL NAA+PROBE: NEGATIVE

## 2022-02-24 PROCEDURE — 1159F PR MEDICATION LIST DOCUMENTED IN MEDICAL RECORD: ICD-10-PCS | Mod: CPTII,S$GLB,, | Performed by: PHYSICIAN ASSISTANT

## 2022-02-24 PROCEDURE — 1160F RVW MEDS BY RX/DR IN RCRD: CPT | Mod: CPTII,S$GLB,, | Performed by: PHYSICIAN ASSISTANT

## 2022-02-24 PROCEDURE — 99203 OFFICE O/P NEW LOW 30 MIN: CPT | Mod: S$GLB,,, | Performed by: PHYSICIAN ASSISTANT

## 2022-02-24 PROCEDURE — 3074F PR MOST RECENT SYSTOLIC BLOOD PRESSURE < 130 MM HG: ICD-10-PCS | Mod: CPTII,S$GLB,, | Performed by: PHYSICIAN ASSISTANT

## 2022-02-24 PROCEDURE — 3078F DIAST BP <80 MM HG: CPT | Mod: CPTII,S$GLB,, | Performed by: PHYSICIAN ASSISTANT

## 2022-02-24 PROCEDURE — U0002: ICD-10-PCS | Mod: QW,S$GLB,, | Performed by: PHYSICIAN ASSISTANT

## 2022-02-24 PROCEDURE — 3074F SYST BP LT 130 MM HG: CPT | Mod: CPTII,S$GLB,, | Performed by: PHYSICIAN ASSISTANT

## 2022-02-24 PROCEDURE — U0002 COVID-19 LAB TEST NON-CDC: HCPCS | Mod: QW,S$GLB,, | Performed by: PHYSICIAN ASSISTANT

## 2022-02-24 PROCEDURE — 3078F PR MOST RECENT DIASTOLIC BLOOD PRESSURE < 80 MM HG: ICD-10-PCS | Mod: CPTII,S$GLB,, | Performed by: PHYSICIAN ASSISTANT

## 2022-02-24 PROCEDURE — 99203 PR OFFICE/OUTPT VISIT, NEW, LEVL III, 30-44 MIN: ICD-10-PCS | Mod: S$GLB,,, | Performed by: PHYSICIAN ASSISTANT

## 2022-02-24 PROCEDURE — 1159F MED LIST DOCD IN RCRD: CPT | Mod: CPTII,S$GLB,, | Performed by: PHYSICIAN ASSISTANT

## 2022-02-24 PROCEDURE — 1160F PR REVIEW ALL MEDS BY PRESCRIBER/CLIN PHARMACIST DOCUMENTED: ICD-10-PCS | Mod: CPTII,S$GLB,, | Performed by: PHYSICIAN ASSISTANT

## 2022-02-24 PROCEDURE — 3008F BODY MASS INDEX DOCD: CPT | Mod: CPTII,S$GLB,, | Performed by: PHYSICIAN ASSISTANT

## 2022-02-24 PROCEDURE — 3008F PR BODY MASS INDEX (BMI) DOCUMENTED: ICD-10-PCS | Mod: CPTII,S$GLB,, | Performed by: PHYSICIAN ASSISTANT

## 2022-02-24 NOTE — PROGRESS NOTES
"Subjective:       Patient ID: Joie Kelly is a 46 y.o. female.    Vitals:  height is 5' 4" (1.626 m) and weight is 95.3 kg (210 lb). Her oral temperature is 98.4 °F (36.9 °C). Her blood pressure is 125/79 and her pulse is 84. Her respiration is 16 and oxygen saturation is 98%.     Chief Complaint: COVID-19 Concerns    Pt states "she is going on a cruise and needs a rapid covid test. She is not having any symptoms."    ROS    Objective:      Physical Exam      Assessment:       No diagnosis found.      Plan:         There are no diagnoses linked to this encounter.               "

## 2022-02-24 NOTE — PROGRESS NOTES
"Subjective:       Patient ID: Joie Kelly is a 46 y.o. female.    Vitals:  height is 5' 4" (1.626 m) and weight is 95.3 kg (210 lb). Her oral temperature is 98.4 °F (36.9 °C). Her blood pressure is 125/79 and her pulse is 84. Her respiration is 16 and oxygen saturation is 98%.     Chief Complaint: COVID-19 Concerns    Patient is a 46-year-old female who presents for COVID testing.  She denied past medical history.  She states that she is currently vaccinated and pasted for COVID.  She is going on a cruise and requires testing prior to travel.  She denies any complaints.      Constitution: Negative for chills and fever.   HENT: Negative for sore throat.    Respiratory: Negative for cough.    Gastrointestinal: Negative for abdominal pain, nausea, vomiting and diarrhea.       Objective:      Physical Exam   Constitutional: She appears well-developed.   HENT:   Head: Normocephalic and atraumatic.   Ears:   Right Ear: Tympanic membrane and external ear normal.   Left Ear: Tympanic membrane and external ear normal.   Nose: Nose normal.   Mouth/Throat: Uvula is midline and oropharynx is clear and moist.   Cardiovascular: Normal rate and normal heart sounds.   Pulmonary/Chest: Effort normal and breath sounds normal. No stridor. No respiratory distress. She has no decreased breath sounds.   Abdominal: Normal appearance.   Neurological: She is alert.   Nursing note and vitals reviewed.        Assessment:       1. Encounter for laboratory testing for COVID-19 virus          Plan:         Encounter for laboratory testing for COVID-19 virus  -     POCT COVID-19 Rapid Screening           Medical Decision Making:   History:   Old Medical Records: I decided to obtain old medical records.  Clinical Tests:   Lab Tests: Ordered and Reviewed  Urgent Care Management:  Urgent evaluation of a well-appearing 46-year-old female who presents for COVID testing.  She reports need for testing to travel.  She denies any complaints.  She " is currently vaccinated.  COVID test is negative.  Recommend follow up PCP as needed.

## 2022-04-12 DIAGNOSIS — I67.1 ANEURYSM OF INTERNAL CAROTID ARTERY: Primary | ICD-10-CM

## 2022-04-19 ENCOUNTER — HOSPITAL ENCOUNTER (OUTPATIENT)
Dept: RADIOLOGY | Facility: HOSPITAL | Age: 47
Discharge: HOME | End: 2022-04-19
Attending: NURSE PRACTITIONER
Payer: MEDICAID

## 2022-04-19 DIAGNOSIS — I67.1 ANEURYSM OF INTERNAL CAROTID ARTERY: ICD-10-CM

## 2022-04-19 PROCEDURE — 70496 CT ANGIOGRAPHY HEAD: CPT | Mod: TC,PO

## 2022-04-19 PROCEDURE — 25500020 PHARM REV CODE 255: Mod: PO | Performed by: NURSE PRACTITIONER

## 2022-04-19 RX ADMIN — IOHEXOL 100 ML: 350 INJECTION, SOLUTION INTRAVENOUS at 03:04

## 2022-07-06 ENCOUNTER — OFFICE VISIT (OUTPATIENT)
Dept: NEUROSURGERY | Facility: CLINIC | Age: 47
End: 2022-07-06
Payer: MEDICAID

## 2022-07-06 VITALS
RESPIRATION RATE: 18 BRPM | BODY MASS INDEX: 35.87 KG/M2 | DIASTOLIC BLOOD PRESSURE: 68 MMHG | HEIGHT: 64 IN | WEIGHT: 210.13 LBS | SYSTOLIC BLOOD PRESSURE: 102 MMHG

## 2022-07-06 DIAGNOSIS — I67.1 CEREBRAL ANEURYSM, NONRUPTURED: Primary | ICD-10-CM

## 2022-07-06 PROCEDURE — 99204 PR OFFICE/OUTPT VISIT, NEW, LEVL IV, 45-59 MIN: ICD-10-PCS | Mod: S$PBB,,, | Performed by: NEUROLOGICAL SURGERY

## 2022-07-06 PROCEDURE — 99204 OFFICE O/P NEW MOD 45 MIN: CPT | Mod: S$PBB,,, | Performed by: NEUROLOGICAL SURGERY

## 2022-07-06 PROCEDURE — 3008F BODY MASS INDEX DOCD: CPT | Mod: CPTII,,, | Performed by: NEUROLOGICAL SURGERY

## 2022-07-06 PROCEDURE — 3008F PR BODY MASS INDEX (BMI) DOCUMENTED: ICD-10-PCS | Mod: CPTII,,, | Performed by: NEUROLOGICAL SURGERY

## 2022-07-06 PROCEDURE — 3074F SYST BP LT 130 MM HG: CPT | Mod: CPTII,,, | Performed by: NEUROLOGICAL SURGERY

## 2022-07-06 PROCEDURE — 3078F PR MOST RECENT DIASTOLIC BLOOD PRESSURE < 80 MM HG: ICD-10-PCS | Mod: CPTII,,, | Performed by: NEUROLOGICAL SURGERY

## 2022-07-06 PROCEDURE — 3078F DIAST BP <80 MM HG: CPT | Mod: CPTII,,, | Performed by: NEUROLOGICAL SURGERY

## 2022-07-06 PROCEDURE — 3074F PR MOST RECENT SYSTOLIC BLOOD PRESSURE < 130 MM HG: ICD-10-PCS | Mod: CPTII,,, | Performed by: NEUROLOGICAL SURGERY

## 2022-07-06 NOTE — PROGRESS NOTES
"  Neurosurgery History & Physical    Patient ID: Joie Kelly is a 46 y.o. female.    Chief Complaint   Patient presents with    Aortic Aneurysm     Headaches occur but "not at this moment". Went to ED for headaches. Told to go to neuro (2019) but delayed until recently.       History of Present Illness:   Ms. Kelly is a 46 year old female who presents today for evaluation of possible aneurysm. Workup began after neurology evaluation of headaches. Vessel imaging was performed which revealed possible aneurysm. Surveillance was recommended initially at 6 months which remained stable. Neurology then recommended NSGY evaluation to discuss surveillance and how often repeat imaging is needed.     Her headaches are well managed with medications.     She denies issues with blood pressure. She is not a smoker.     She reports her first cousin passed from aneurysm rupture. Otherwise no immediate family history.       Review of Systems   Constitutional: Negative for activity change and fatigue.   HENT: Negative for hearing loss.    Eyes: Negative for photophobia and visual disturbance.   Respiratory: Negative for cough.    Cardiovascular: Negative for chest pain.   Gastrointestinal: Negative for nausea and vomiting.   Endocrine: Negative for cold intolerance and heat intolerance.   Genitourinary: Negative for difficulty urinating.   Musculoskeletal: Negative for gait problem.   Skin: Negative for wound.   Allergic/Immunologic: Negative for immunocompromised state.   Neurological: Positive for headaches. Negative for weakness and numbness.   Psychiatric/Behavioral: Negative for confusion.       Past Medical History:   Diagnosis Date    History of       Social History     Socioeconomic History    Marital status: Significant Other    Number of children: 4   Tobacco Use    Smokeless tobacco: Never Used   Substance and Sexual Activity    Alcohol use: No     Social Determinants of Health     Financial Resource " "Strain: Low Risk     Difficulty of Paying Living Expenses: Not very hard   Food Insecurity: No Food Insecurity    Worried About Running Out of Food in the Last Year: Never true    Ran Out of Food in the Last Year: Never true   Transportation Needs: No Transportation Needs    Lack of Transportation (Medical): No    Lack of Transportation (Non-Medical): No   Social Connections: Unknown    Frequency of Communication with Friends and Family: More than three times a week    Frequency of Social Gatherings with Friends and Family: More than three times a week   Housing Stability: Unknown    Unable to Pay for Housing in the Last Year: No    Unstable Housing in the Last Year: No     History reviewed. No pertinent family history.  Review of patient's allergies indicates:  No Known Allergies    Current Outpatient Medications:     acetaminophen (TYLENOL) 500 MG tablet, Take 500 mg by mouth every 6 (six) hours as needed for Pain., Disp: , Rfl:     multivitamin (THERAGRAN) tablet, Take 1 tablet by mouth once daily., Disp: , Rfl:     omeprazole (PRILOSEC) 40 MG capsule, Take 40 mg by mouth daily as needed (heartburn)., Disp: , Rfl:   Blood pressure 102/68, resp. rate 18, height 5' 4" (1.626 m), weight 95.3 kg (210 lb 1.6 oz), last menstrual period 12/12/2017.      Neurologic Exam  AAOx4, NAD  MAEW  CN II-XII intact  Gait normal    Imaging:   CTA head dated 4/19/22 personally reviewed and discussed with the patient.   Tiny focal area of enhancement along the anterior margin of the proximal right A2 segment which may represent prominent infundibulum, small berry aneurysm or tortuous vessel. This is stable when compared to prior imaging.     Assessment & Plan:   46 year old female with headaches and tiny focal area of enhancement, suspected infundibulum versus aneurysm. We discussed imaging findings at length as well as indications for cerebral angiography or treatment of the lesion. Cerebral angiography could be " considered for further characterization of the lesion. Otherwise, annual imaging to monitor stability is reasonable.     We discussed modifiable risk factors including abstaining from smoking and blood pressure control, ideally systolic <130.     After discussion of options, she would like to proceed with cerebral angiography. Will plan for this in the near future.

## 2022-07-08 ENCOUNTER — OFFICE VISIT (OUTPATIENT)
Dept: URGENT CARE | Facility: CLINIC | Age: 47
End: 2022-07-08
Payer: MEDICAID

## 2022-07-08 VITALS
RESPIRATION RATE: 16 BRPM | OXYGEN SATURATION: 99 % | HEIGHT: 63 IN | SYSTOLIC BLOOD PRESSURE: 100 MMHG | DIASTOLIC BLOOD PRESSURE: 69 MMHG | WEIGHT: 206 LBS | TEMPERATURE: 97 F | HEART RATE: 73 BPM | BODY MASS INDEX: 36.5 KG/M2

## 2022-07-08 DIAGNOSIS — Z20.822 COVID-19 VIRUS NOT DETECTED: ICD-10-CM

## 2022-07-08 DIAGNOSIS — Z11.52 ENCOUNTER FOR SCREENING FOR COVID-19: Primary | ICD-10-CM

## 2022-07-08 PROBLEM — I67.1 ANEURYSM OF ANTERIOR CEREBRAL ARTERY: Status: ACTIVE | Noted: 2022-02-25

## 2022-07-08 PROBLEM — K21.9 GASTROESOPHAGEAL REFLUX DISEASE: Status: ACTIVE | Noted: 2022-02-15

## 2022-07-08 PROBLEM — E66.01 OBESITIES, MORBID: Status: ACTIVE | Noted: 2022-03-22

## 2022-07-08 PROBLEM — G43.709 NON-REFRACTORY CHRONIC MIGRAINE WITHOUT AURA: Status: ACTIVE | Noted: 2022-02-25

## 2022-07-08 PROBLEM — K80.20 CALCULUS OF GALLBLADDER: Status: ACTIVE | Noted: 2022-07-08

## 2022-07-08 PROBLEM — K81.1 CHOLECYSTITIS, CHRONIC: Status: ACTIVE | Noted: 2022-07-08

## 2022-07-08 LAB
CTP QC/QA: YES
SARS-COV-2 AG RESP QL IA.RAPID: NEGATIVE

## 2022-07-08 PROCEDURE — 3074F SYST BP LT 130 MM HG: CPT | Mod: CPTII,S$GLB,, | Performed by: NURSE PRACTITIONER

## 2022-07-08 PROCEDURE — 99203 OFFICE O/P NEW LOW 30 MIN: CPT | Mod: S$GLB,,, | Performed by: NURSE PRACTITIONER

## 2022-07-08 PROCEDURE — 99203 PR OFFICE/OUTPT VISIT, NEW, LEVL III, 30-44 MIN: ICD-10-PCS | Mod: S$GLB,,, | Performed by: NURSE PRACTITIONER

## 2022-07-08 PROCEDURE — 1160F RVW MEDS BY RX/DR IN RCRD: CPT | Mod: CPTII,S$GLB,, | Performed by: NURSE PRACTITIONER

## 2022-07-08 PROCEDURE — 87811 SARS CORONAVIRUS 2 ANTIGEN POCT, MANUAL READ: ICD-10-PCS | Mod: QW,S$GLB,, | Performed by: NURSE PRACTITIONER

## 2022-07-08 PROCEDURE — 1159F MED LIST DOCD IN RCRD: CPT | Mod: CPTII,S$GLB,, | Performed by: NURSE PRACTITIONER

## 2022-07-08 PROCEDURE — 3078F PR MOST RECENT DIASTOLIC BLOOD PRESSURE < 80 MM HG: ICD-10-PCS | Mod: CPTII,S$GLB,, | Performed by: NURSE PRACTITIONER

## 2022-07-08 PROCEDURE — 3008F BODY MASS INDEX DOCD: CPT | Mod: CPTII,S$GLB,, | Performed by: NURSE PRACTITIONER

## 2022-07-08 PROCEDURE — 3074F PR MOST RECENT SYSTOLIC BLOOD PRESSURE < 130 MM HG: ICD-10-PCS | Mod: CPTII,S$GLB,, | Performed by: NURSE PRACTITIONER

## 2022-07-08 PROCEDURE — 1160F PR REVIEW ALL MEDS BY PRESCRIBER/CLIN PHARMACIST DOCUMENTED: ICD-10-PCS | Mod: CPTII,S$GLB,, | Performed by: NURSE PRACTITIONER

## 2022-07-08 PROCEDURE — 3078F DIAST BP <80 MM HG: CPT | Mod: CPTII,S$GLB,, | Performed by: NURSE PRACTITIONER

## 2022-07-08 PROCEDURE — 1159F PR MEDICATION LIST DOCUMENTED IN MEDICAL RECORD: ICD-10-PCS | Mod: CPTII,S$GLB,, | Performed by: NURSE PRACTITIONER

## 2022-07-08 PROCEDURE — 3008F PR BODY MASS INDEX (BMI) DOCUMENTED: ICD-10-PCS | Mod: CPTII,S$GLB,, | Performed by: NURSE PRACTITIONER

## 2022-07-08 PROCEDURE — 87811 SARS-COV-2 COVID19 W/OPTIC: CPT | Mod: QW,S$GLB,, | Performed by: NURSE PRACTITIONER

## 2022-07-08 RX ORDER — RIMEGEPANT SULFATE 75 MG/75MG
75 TABLET, ORALLY DISINTEGRATING ORAL DAILY PRN
COMMUNITY
Start: 2022-07-05

## 2022-07-08 RX ORDER — BUTALBITAL, ACETAMINOPHEN AND CAFFEINE 50; 325; 40 MG/1; MG/1; MG/1
1 TABLET ORAL EVERY 4 HOURS PRN
COMMUNITY
Start: 2022-01-26

## 2022-07-08 RX ORDER — RIZATRIPTAN BENZOATE 10 MG/1
20 TABLET ORAL 2 TIMES DAILY
COMMUNITY
Start: 2022-07-06

## 2022-07-08 RX ORDER — PROPRANOLOL HYDROCHLORIDE 20 MG/1
20 TABLET ORAL 2 TIMES DAILY
COMMUNITY
Start: 2022-06-25

## 2022-07-08 NOTE — PROGRESS NOTES
"Subjective:       Patient ID: Joie Kelly is a 46 y.o. female.    Vitals:  height is 5' 3" (1.6 m) and weight is 93.4 kg (206 lb). Her oral temperature is 97.3 °F (36.3 °C). Her blood pressure is 100/69 and her pulse is 73. Her respiration is 16 and oxygen saturation is 99%.     Chief Complaint: covid travel    Patient states she needs a rapid covid test for travel. Denies any symptoms.       Constitution: Negative for chills and fever.   HENT: Negative for congestion and sore throat.    Respiratory: Negative for cough.    Gastrointestinal: Negative for nausea, vomiting and diarrhea.   Skin: Negative for rash.       Objective:      Physical Exam   Constitutional: She is oriented to person, place, and time. She appears well-developed.  Non-toxic appearance. She does not appear ill. No distress.   HENT:   Head: Normocephalic and atraumatic.   Nose: Nose normal.   Mouth/Throat: Oropharynx is clear and moist. Mucous membranes are moist.   Eyes: Conjunctivae and EOM are normal. Right eye exhibits no discharge. Left eye exhibits no discharge.   Cardiovascular: Normal rate.   Pulmonary/Chest: Effort normal and breath sounds normal. No respiratory distress.   Abdominal: Normal appearance.   Neurological: no focal deficit. She is alert and oriented to person, place, and time.   Skin: Skin is warm, dry and not diaphoretic. Capillary refill takes 2 to 3 seconds.   Psychiatric: Her behavior is normal. Mood normal.   Nursing note and vitals reviewed.        Assessment:       1. Encounter for screening for COVID-19    2. COVID-19 virus not detected          Plan:         Encounter for screening for COVID-19  -     SARS Coronavirus 2 Antigen, POCT Manual Read    COVID-19 virus not detected                   "

## 2022-07-27 DIAGNOSIS — I67.1 ANEURYSM OF ANTERIOR CEREBRAL ARTERY: Primary | ICD-10-CM

## 2022-07-29 ENCOUNTER — TELEPHONE (OUTPATIENT)
Dept: NEUROSURGERY | Facility: CLINIC | Age: 47
End: 2022-07-29
Payer: MEDICAID

## 2022-07-29 NOTE — TELEPHONE ENCOUNTER
Spoke with patient. Let her know what time to be at hospital (0700) and to not eat or drink anything including gum/candy after midnight night before procedure. To not drink alcohol or smoke 24 hours before procedure. That she will need an adult to take her home after procedure. That a nurse should call her couple days before procedure and discuss this as well. She stated understanding.

## 2022-08-09 ENCOUNTER — LAB VISIT (OUTPATIENT)
Dept: LAB | Facility: HOSPITAL | Age: 47
End: 2022-08-09
Attending: NEUROLOGICAL SURGERY
Payer: MEDICAID

## 2022-08-09 DIAGNOSIS — I67.1 ANEURYSM OF ANTERIOR CEREBRAL ARTERY: ICD-10-CM

## 2022-08-09 LAB
ALBUMIN SERPL BCP-MCNC: 3.8 G/DL (ref 3.5–5.2)
ALP SERPL-CCNC: 61 U/L (ref 55–135)
ALT SERPL W/O P-5'-P-CCNC: 11 U/L (ref 10–44)
ANION GAP SERPL CALC-SCNC: 10 MMOL/L (ref 8–16)
AST SERPL-CCNC: 10 U/L (ref 10–40)
BASOPHILS # BLD AUTO: 0.07 K/UL (ref 0–0.2)
BASOPHILS NFR BLD: 0.8 % (ref 0–1.9)
BILIRUB SERPL-MCNC: 0.4 MG/DL (ref 0.1–1)
BUN SERPL-MCNC: 7 MG/DL (ref 6–20)
CALCIUM SERPL-MCNC: 9.8 MG/DL (ref 8.7–10.5)
CHLORIDE SERPL-SCNC: 103 MMOL/L (ref 95–110)
CO2 SERPL-SCNC: 28 MMOL/L (ref 23–29)
CREAT SERPL-MCNC: 0.7 MG/DL (ref 0.5–1.4)
DIFFERENTIAL METHOD: ABNORMAL
EOSINOPHIL # BLD AUTO: 0.2 K/UL (ref 0–0.5)
EOSINOPHIL NFR BLD: 1.7 % (ref 0–8)
ERYTHROCYTE [DISTWIDTH] IN BLOOD BY AUTOMATED COUNT: 14.2 % (ref 11.5–14.5)
EST. GFR  (NO RACE VARIABLE): >60 ML/MIN/1.73 M^2
GLUCOSE SERPL-MCNC: 94 MG/DL (ref 70–110)
HCT VFR BLD AUTO: 35 % (ref 37–48.5)
HGB BLD-MCNC: 10.8 G/DL (ref 12–16)
IMM GRANULOCYTES # BLD AUTO: 0.03 K/UL (ref 0–0.04)
IMM GRANULOCYTES NFR BLD AUTO: 0.3 % (ref 0–0.5)
INR PPP: 1 (ref 0.8–1.2)
LYMPHOCYTES # BLD AUTO: 2.4 K/UL (ref 1–4.8)
LYMPHOCYTES NFR BLD: 27.8 % (ref 18–48)
MCH RBC QN AUTO: 27.1 PG (ref 27–31)
MCHC RBC AUTO-ENTMCNC: 30.9 G/DL (ref 32–36)
MCV RBC AUTO: 88 FL (ref 82–98)
MONOCYTES # BLD AUTO: 0.7 K/UL (ref 0.3–1)
MONOCYTES NFR BLD: 8 % (ref 4–15)
NEUTROPHILS # BLD AUTO: 5.4 K/UL (ref 1.8–7.7)
NEUTROPHILS NFR BLD: 61.4 % (ref 38–73)
NRBC BLD-RTO: 0 /100 WBC
PLATELET # BLD AUTO: 407 K/UL (ref 150–450)
PMV BLD AUTO: 10 FL (ref 9.2–12.9)
POTASSIUM SERPL-SCNC: 3.6 MMOL/L (ref 3.5–5.1)
PROT SERPL-MCNC: 7.9 G/DL (ref 6–8.4)
PROTHROMBIN TIME: 10.5 SEC (ref 9–12.5)
RBC # BLD AUTO: 3.99 M/UL (ref 4–5.4)
SODIUM SERPL-SCNC: 141 MMOL/L (ref 136–145)
WBC # BLD AUTO: 8.75 K/UL (ref 3.9–12.7)

## 2022-08-09 PROCEDURE — 85610 PROTHROMBIN TIME: CPT | Performed by: NEUROLOGICAL SURGERY

## 2022-08-09 PROCEDURE — 80053 COMPREHEN METABOLIC PANEL: CPT | Performed by: NEUROLOGICAL SURGERY

## 2022-08-09 PROCEDURE — 36415 COLL VENOUS BLD VENIPUNCTURE: CPT | Mod: PO | Performed by: NEUROLOGICAL SURGERY

## 2022-08-09 PROCEDURE — 85025 COMPLETE CBC W/AUTO DIFF WBC: CPT | Performed by: NEUROLOGICAL SURGERY

## 2022-08-18 ENCOUNTER — TELEPHONE (OUTPATIENT)
Dept: INTERVENTIONAL RADIOLOGY/VASCULAR | Facility: HOSPITAL | Age: 47
End: 2022-08-18
Payer: MEDICAID

## 2022-08-19 ENCOUNTER — HOSPITAL ENCOUNTER (OUTPATIENT)
Dept: INTERVENTIONAL RADIOLOGY/VASCULAR | Facility: HOSPITAL | Age: 47
Discharge: HOME | End: 2022-08-19
Attending: NEUROLOGICAL SURGERY
Payer: MEDICAID

## 2022-08-19 VITALS
HEIGHT: 63 IN | RESPIRATION RATE: 18 BRPM | WEIGHT: 203 LBS | SYSTOLIC BLOOD PRESSURE: 132 MMHG | OXYGEN SATURATION: 98 % | TEMPERATURE: 97 F | DIASTOLIC BLOOD PRESSURE: 77 MMHG | HEART RATE: 68 BPM | BODY MASS INDEX: 35.97 KG/M2

## 2022-08-19 DIAGNOSIS — I67.1 ANEURYSM OF ANTERIOR CEREBRAL ARTERY: ICD-10-CM

## 2022-08-19 PROCEDURE — 25000003 PHARM REV CODE 250: Performed by: NEUROLOGICAL SURGERY

## 2022-08-19 PROCEDURE — 36226 PLACE CATH VERTEBRAL ART: CPT | Mod: 50,51,, | Performed by: NEUROLOGICAL SURGERY

## 2022-08-19 PROCEDURE — G0269 OCCLUSIVE DEVICE IN VEIN ART: HCPCS | Mod: 59 | Performed by: NEUROLOGICAL SURGERY

## 2022-08-19 PROCEDURE — 36226 PLACE CATH VERTEBRAL ART: CPT | Mod: 50 | Performed by: NEUROLOGICAL SURGERY

## 2022-08-19 PROCEDURE — C1894 INTRO/SHEATH, NON-LASER: HCPCS

## 2022-08-19 PROCEDURE — 76377 3D RENDER W/INTRP POSTPROCES: CPT | Mod: TC | Performed by: NEUROLOGICAL SURGERY

## 2022-08-19 PROCEDURE — 36224 IR ANGIOGRAM CAROTID INTERNAL INC ARCH AND CEREBRAL BILAT: ICD-10-PCS | Mod: 50,,, | Performed by: NEUROLOGICAL SURGERY

## 2022-08-19 PROCEDURE — C1769 GUIDE WIRE: HCPCS

## 2022-08-19 PROCEDURE — 25500020 PHARM REV CODE 255: Performed by: NEUROLOGICAL SURGERY

## 2022-08-19 PROCEDURE — 63600175 PHARM REV CODE 636 W HCPCS: Performed by: NEUROLOGICAL SURGERY

## 2022-08-19 PROCEDURE — 76377 PR  3D RENDERING W/ IMAGE POSTPROCESS: ICD-10-PCS | Mod: 26,,, | Performed by: NEUROLOGICAL SURGERY

## 2022-08-19 PROCEDURE — 36227 PLACE CATH XTRNL CAROTID: CPT | Mod: 50 | Performed by: NEUROLOGICAL SURGERY

## 2022-08-19 PROCEDURE — 36224 PLACE CATH CAROTD ART: CPT | Mod: 50 | Performed by: NEUROLOGICAL SURGERY

## 2022-08-19 PROCEDURE — 36226 PR ANGIO VERTEBRAL ARTERY +/- CERVIOCEREBRAL ARCH, VERTEBRAL ART, SELECTV CATH,S&I: ICD-10-PCS | Mod: 50,51,, | Performed by: NEUROLOGICAL SURGERY

## 2022-08-19 PROCEDURE — 36227 PLACE CATH XTRNL CAROTID: CPT | Mod: 50,,, | Performed by: NEUROLOGICAL SURGERY

## 2022-08-19 PROCEDURE — 76377 3D RENDER W/INTRP POSTPROCES: CPT | Mod: 26,,, | Performed by: NEUROLOGICAL SURGERY

## 2022-08-19 PROCEDURE — 36227 PR ANGIO XTRNL CAROTD CIRC, XTRNL CAROTID, SELECTV CATH S&I: ICD-10-PCS | Mod: 50,,, | Performed by: NEUROLOGICAL SURGERY

## 2022-08-19 RX ORDER — MIDAZOLAM HYDROCHLORIDE 1 MG/ML
INJECTION INTRAMUSCULAR; INTRAVENOUS CODE/TRAUMA/SEDATION MEDICATION
Status: COMPLETED | OUTPATIENT
Start: 2022-08-19 | End: 2022-08-19

## 2022-08-19 RX ORDER — HEPARIN SODIUM 1000 [USP'U]/ML
INJECTION, SOLUTION INTRAVENOUS; SUBCUTANEOUS CODE/TRAUMA/SEDATION MEDICATION
Status: COMPLETED | OUTPATIENT
Start: 2022-08-19 | End: 2022-08-19

## 2022-08-19 RX ORDER — LIDOCAINE HYDROCHLORIDE 10 MG/ML
INJECTION INFILTRATION; PERINEURAL CODE/TRAUMA/SEDATION MEDICATION
Status: COMPLETED | OUTPATIENT
Start: 2022-08-19 | End: 2022-08-19

## 2022-08-19 RX ORDER — IODIXANOL 320 MG/ML
200 INJECTION, SOLUTION INTRAVASCULAR
Status: COMPLETED | OUTPATIENT
Start: 2022-08-19 | End: 2022-08-19

## 2022-08-19 RX ORDER — FENTANYL CITRATE 50 UG/ML
INJECTION, SOLUTION INTRAMUSCULAR; INTRAVENOUS CODE/TRAUMA/SEDATION MEDICATION
Status: COMPLETED | OUTPATIENT
Start: 2022-08-19 | End: 2022-08-19

## 2022-08-19 RX ORDER — SODIUM CHLORIDE 9 MG/ML
INJECTION, SOLUTION INTRAVENOUS
Status: COMPLETED | OUTPATIENT
Start: 2022-08-19 | End: 2022-08-19

## 2022-08-19 RX ORDER — SODIUM CHLORIDE 9 MG/ML
INJECTION, SOLUTION INTRAVENOUS CONTINUOUS
Status: DISCONTINUED | OUTPATIENT
Start: 2022-08-19 | End: 2022-08-20 | Stop reason: HOSPADM

## 2022-08-19 RX ADMIN — HEPARIN SODIUM 30000 UNITS: 1000 INJECTION, SOLUTION INTRAVENOUS; SUBCUTANEOUS at 08:08

## 2022-08-19 RX ADMIN — LIDOCAINE HYDROCHLORIDE 5 ML: 10 INJECTION, SOLUTION INFILTRATION; PERINEURAL at 08:08

## 2022-08-19 RX ADMIN — MIDAZOLAM HYDROCHLORIDE 1 MG: 1 INJECTION INTRAMUSCULAR; INTRAVENOUS at 09:08

## 2022-08-19 RX ADMIN — IODIXANOL 120 ML: 320 INJECTION, SOLUTION INTRAVASCULAR at 10:08

## 2022-08-19 RX ADMIN — MIDAZOLAM HYDROCHLORIDE 1 MG: 1 INJECTION INTRAMUSCULAR; INTRAVENOUS at 08:08

## 2022-08-19 RX ADMIN — FENTANYL CITRATE 50 MCG: 50 INJECTION, SOLUTION INTRAMUSCULAR; INTRAVENOUS at 09:08

## 2022-08-19 RX ADMIN — SODIUM CHLORIDE 6000 ML: 0.9 INJECTION, SOLUTION INTRAVENOUS at 08:08

## 2022-08-19 RX ADMIN — FENTANYL CITRATE 50 MCG: 50 INJECTION, SOLUTION INTRAMUSCULAR; INTRAVENOUS at 08:08

## 2022-08-19 NOTE — H&P
History and Physical  Interventional Radiology    Admit Date: 8/19/2022  LOS: 0    Code Status: Full Code     CC: <principal problem not specified>    SUBJECTIVE:     History of Present Illness: 48 yo female presenting for elective diagnostic angiography for evaluation of possible R A2 IA identified during workup of headaches.    Pt is neurologically intact on exam.           OBJECTIVE:   Vital Signs (Most Recent):   Temp: 97.2 °F (36.2 °C) (08/19/22 0651)  Pulse: 67 (08/19/22 0651)  Resp: 16 (08/19/22 0651)  BP: 122/69 (08/19/22 0709)  SpO2: 97 % (08/19/22 0651)    Vital Signs (24h Range):   Temp:  [97.2 °F (36.2 °C)] 97.2 °F (36.2 °C)  Pulse:  [67] 67  Resp:  [16] 16  SpO2:  [97 %] 97 %  BP: (122)/(69) 122/69      I & O (Last 24h):  No intake or output data in the 24 hours ending 08/19/22 0732    Physical Exam:  General: well developed, well nourished, no distress.   Head: normocephalic, atraumatic  Cervical Spine: No midline tenderness to palpation.  Thoracolumbosacral Spine: No midline tenderness to palpation.  GCS: Motor: 6/Verbal: 5/Eyes: 4 GCS Total: 15  Mental Status: Awake, Alert, Oriented x 4  Language: No aphasia  Speech: No dysarthria  Facial Droop: None   Cranial nerves: CN III-XII grossly intact.  Visual Fields: Intact.   Eyes: Pupils equal and reactive to light. Intact Conjugate horizontal and vertical pursuit. No nystagmus. No gaze deviation.   Pulmonary: No distress.  Sensory: No deficit.  Propioception: No deficit in 1st digit of toe bilaterally.  Rectal Tone: Not tested.  Drift: None.  Upper Extremity Ataxia: No Dysmetria Bilaterally  Lower Extremity Ataxia: Not tested.  Dysdiadochokinesia: Not tested.  Reflexes: 2+ patellar bilaterally.  Michelle: Absent  Clonus: Absent  Babinski: Absent  Romberg: Not Tested  Pulses: Brisk and symmetric radial, DP and tibial pulses.  Motor Strength:    Strength  Shoulder Abduction Elbow Extension Elbow Flexion Wrist Extension Wrist Flexion Finger Opposition Finger  Add Finger Abd   Upper: R 5/5 5/5 5/5 5/5 5/5 5/5 5/5 5/5    L 5/5 5/5 5/5 5/5 5/5 5/5 5/5 5/5     Hip Flexion Knee Extension Knee  Flexion Ankle Dflexion Ankle Pflexion EHL     Lower: R 5/5 5/5 5/5 5/5 5/5 5/5      L 5/5 5/5 5/5 5/5 5/5 5/5           Lines/Drains/Airway:          Nutrition/Tube Feeds:   Current Diet Order   Procedures    Diet NPO       Labs:  ABG: No results for input(s): PH, PO2, PCO2, HCO3, POCSATURATED, BE in the last 24 hours.  BMP:No results for input(s): NA, K, CL, CO2, BUN, CREATININE, GLU, MG, PHOS in the last 24 hours.  LFT:   Lab Results   Component Value Date    AST 10 08/09/2022    ALT 11 08/09/2022    ALKPHOS 61 08/09/2022    BILITOT 0.4 08/09/2022    ALBUMIN 3.8 08/09/2022    PROT 7.9 08/09/2022     CBC:   Lab Results   Component Value Date    WBC 8.75 08/09/2022    HGB 10.8 (L) 08/09/2022    HCT 35.0 (L) 08/09/2022    MCV 88 08/09/2022     08/09/2022     Microbiology x 7d:   Microbiology Results (last 7 days)     ** No results found for the last 168 hours. **            ASSESSMENT/PLAN:   46 yo female presenting for elective diagnostic angiography for evaluation of possible R A2 IA     --To angio for diagnostic cerebral DSA.  --We will continue to monitor closely, please contact us with any questions or concerns.    Virgilio Lucio    ASA 2  Mallampati 2  Moderate Sedation

## 2022-08-19 NOTE — PLAN OF CARE
Pt ambulated on unit this morning with spouse at side.  Denies pain or SOB.  Verbalized an understanding of procedure.  Oriented to unit and call bell provided.  Will continue to monitor.

## 2022-08-19 NOTE — DISCHARGE INSTRUCTIONS
For scheduling: Call Gertrudis at [phone]    For questions or concerns call: [facility] 8 AM- 5PM [phone]. Radiology resident on call [phone].    For immediate concerns that are not emergent, you may call our radiology clinic at: [phone]    Discharge Instructions for Cerebral Angiogram   When you get home after your procedure, do the following:  Monitor the injection site for bleeding. A small bruise is normal as is an occasional drop of blood at the site.  Monitor the limb that was used for changes in temperature, color, numbness, tingling, or loss of function.  Take your prescribed antiplatelet medicines as directed. However, they may cause you to bruise more easily.  Shower instead of taking tub baths for a few days. But, wait for your doctors OK to get the wound wet first.  Avoid lifting anything over 10 pounds for a few days.  Take it easy, but try to get back to your normal routine as much as possible.  Ask your doctor about driving, returning to work, and other activities.  When to call your doctor  Call your doctor if you have any of the following problems:  Problems at the incision site, such as swelling, redness, bleeding, warmth, leaking of fluids, or increasing pain  A cold or painful leg or foot  Severe headache  Weakness or numbness in a leg or arm  Difficulty talking, or difficulty finding the words to say what you want  Changes in your vision  Dizziness or imbalance  Go to the emergency room if your doctors office is closed.      Keeping appointments for follow-up care helps make your procedure a success.   Your follow-up  Within a month after the procedure, youll have a follow-up exam and tests. These tests may include an ultrasound and a brain function exam. Then youll be monitored with ultrasound or another imaging test every 6 months for 1 to 2 years. After that, youll be monitored at least every 12 months. You may also continue to take antiplatelet medication. In some  cases, the carotid can narrow again. If this happens, it can often be treated again with balloon angioplasty.  Call 911  Call 911 if you have any of these stroke symptoms:  Paralysis or weakness on 1 side of the body  Numbness or tingling on 1 side of the body  Difficulty speaking  Loss of vision in 1 eye  Drooping on 1 side of the face  Dizziness or imbalance  Swelling or persistent pain in the groin     May remove dressing in 24 hours and shower.   Do Not sit in bath water, hot tub, or swim for 7 days

## 2022-08-19 NOTE — NURSING
Cerebral angiogramcomplete. Pt tolerated well. VSS. No signs or symptoms of distress noted. Mynx closure device in place. Hemostasis 0940. Pt to remain flat until 1140.  Pt will be transferred to MPU bed escorted by this RN and report to RN at bedside

## 2022-08-19 NOTE — PROGRESS NOTES
AAO w/ NAD  in room and assisting pt to get dressed / review D/C inst and S&S of infection and when to go to ED / transport pending

## 2022-08-19 NOTE — PROGRESS NOTES
Pt arrived from IR S/P Cerebral Angio / pt eyes open to stimulus / placed to monitor and site check done NAD noted / will  Allow to rest and instructed to remain flat for 2 hours

## 2022-08-19 NOTE — PROGRESS NOTES
went  To car and  Said will return / Dr Chris Stephenson came by and spoke with pt / negative for aneurysm and inform may call  Office as needed

## 2022-08-19 NOTE — PLAN OF CARE
Pt arrived to room 4 for cerebral angiogram, escorted to room by this RN. Pt oriented to unit and staff. Plan of care reviewed with patient, patient verbalizes understanding. Comfort measures utilized. Pt safely transferred from stretcher to procedural table. Fall risk reviewed with patient, fall risk interventions maintained with arm boards and direct observation/attendance. Blankets applied. Pt prepped and draped utilizing standard sterile technique. Patient placed on continuous monitoring, as required by sedation policy. Timeouts completed utilizing standard universal time-out, per department and facility policy. RN to remain at bedside, continuous monitoring maintained. Pt resting comfortably. Denies pain/discomfort. Will continue to monitor. See flow sheets for monitoring, medication administration, and updates.

## 2022-08-19 NOTE — PROGRESS NOTES
Pt AAO w/ NAD  arrived and informed of results per Dr Stephenson and to call MD office as needed / plan on D/C as scheduled

## 2022-11-29 ENCOUNTER — HOSPITAL ENCOUNTER (EMERGENCY)
Facility: HOSPITAL | Age: 47
Discharge: HOME | End: 2022-11-29
Attending: EMERGENCY MEDICINE
Payer: MEDICAID

## 2022-11-29 VITALS
HEIGHT: 63 IN | SYSTOLIC BLOOD PRESSURE: 117 MMHG | WEIGHT: 197 LBS | DIASTOLIC BLOOD PRESSURE: 71 MMHG | RESPIRATION RATE: 20 BRPM | TEMPERATURE: 99 F | HEART RATE: 73 BPM | OXYGEN SATURATION: 98 % | BODY MASS INDEX: 34.91 KG/M2

## 2022-11-29 DIAGNOSIS — M54.6 THORACIC BACK PAIN: ICD-10-CM

## 2022-11-29 PROCEDURE — 93010 ELECTROCARDIOGRAM REPORT: CPT | Mod: ,,, | Performed by: INTERNAL MEDICINE

## 2022-11-29 PROCEDURE — 93010 EKG 12-LEAD: ICD-10-PCS | Mod: ,,, | Performed by: INTERNAL MEDICINE

## 2022-11-29 PROCEDURE — 25000003 PHARM REV CODE 250: Performed by: EMERGENCY MEDICINE

## 2022-11-29 PROCEDURE — 99284 EMERGENCY DEPT VISIT MOD MDM: CPT | Mod: 25

## 2022-11-29 PROCEDURE — 93005 ELECTROCARDIOGRAM TRACING: CPT

## 2022-11-29 RX ORDER — IBUPROFEN 400 MG/1
400 TABLET ORAL
Status: COMPLETED | OUTPATIENT
Start: 2022-11-29 | End: 2022-11-29

## 2022-11-29 RX ADMIN — IBUPROFEN 400 MG: 400 TABLET, FILM COATED ORAL at 10:11

## 2022-11-29 NOTE — ED NOTES
Assumed care    Patient presents to ED with complaints of left lower back pain that started 3 days ago. Denies n/v/d or constipation. Pain worsens when lying on left side and turning to left side. No changes in urinary or bowel status. Denies fever at home. Patient denies chest pain or shortness of breath.

## 2022-11-29 NOTE — ED NOTES
Patient states that the pain is from flank to upper back states it hurts sometimes when she takes a deep breath

## 2022-11-29 NOTE — ED PROVIDER NOTES
Encounter Date: 2022    SCRIBE #1 NOTE: I, Bartolome Larry, am scribing for, and in the presence of,  Stas Wagoner MD.     History     Chief Complaint   Patient presents with    Flank Pain     Left side flank pain, denies fever nausea, vomiting pain upon urination      Time seen by provider: 10:32 AM on 2022    Joie Kelly is a 47 y.o. female who presents to the ED with flank pain. The patient reports experiencing pain to her left side that started 3-4 days ago. She notes that she feels pain when inhaling, and that the pain is exacerbated when she moves in a certain way. The patient states that the pain does not radiate anywhere else. Per the patient, she has experienced a similar pain before in the past in the same spot. She described the pain she felt as sharp and intermittent and originally thought she was having a heart attack. The patient then mentioned having an EKG and chest X-ray done in the past with normal results. 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 The patient denies doing much physical activities lately. She states that she placed an Icy Hot patch to her left side with no improvement to the pain. The patient mentions that the pain is getting worse with time. She has not taken any medication for the pain today. The patient denies fever, cough, congestion, chest pain, dysuria, hematuria, difficulty urinating, hx of back surgeries, fever, IV drug use, hormone therapy, hx of heart attacks or blood clots, or any other symptoms at this time. PMHx of obesity.PSHx of hysterectomy.    The history is provided by the patient.   Review of patient's allergies indicates:  No Known Allergies  Past Medical History:   Diagnosis Date    History of       Past Surgical History:   Procedure Laterality Date    HYSTERECTOMY      TUBAL LIGATION       No family history on file.  Social History      Tobacco Use    Smokeless tobacco: Never   Substance Use Topics    Alcohol use: No     Review of Systems   Constitutional:  Negative for fever.   HENT:  Negative for congestion and sore throat.    Respiratory:  Negative for cough and shortness of breath.    Cardiovascular:  Negative for chest pain.   Gastrointestinal:  Negative for abdominal pain and nausea.   Genitourinary:  Positive for flank pain. Negative for difficulty urinating, dysuria and hematuria.   Musculoskeletal:  Negative for back pain.   Skin:  Negative for rash.   Neurological:  Negative for weakness.   Hematological:  Does not bruise/bleed easily.     Physical Exam     Initial Vitals [11/29/22 0955]   BP Pulse Resp Temp SpO2   127/70 74 20 98.7 °F (37.1 °C) 99 %      MAP       --         Physical Exam    Nursing note and vitals reviewed.  Constitutional: She appears well-developed and well-nourished. She is not diaphoretic. No distress.   HENT:   Head: Normocephalic and atraumatic.   Mouth/Throat: Oropharynx is clear and moist.   Eyes: Conjunctivae are normal.   Neck: Neck supple.   Cardiovascular:  Normal rate, regular rhythm, normal heart sounds and intact distal pulses.     Exam reveals no gallop and no friction rub.       No murmur heard.  Pulmonary/Chest: Breath sounds normal. She has no wheezes. She has no rhonchi. She has no rales.   Abdominal: Abdomen is soft. She exhibits no distension. There is no abdominal tenderness.   Musculoskeletal:         General: Normal range of motion.      Cervical back: Neck supple.      Thoracic back: Tenderness present.      Comments: Reproducible left lateral thoracic back tenderness in scapular region. No rash noted. No midline tenderness or flank tenderness noted.     Neurological: She is alert and oriented to person, place, and time.   5/5 strength and sensation in upper and lower extremities.   Skin: No rash noted. No erythema.   Psychiatric: Her speech is normal.       ED Course   Procedures  Labs  Reviewed - No data to display         Imaging Results              X-Ray Chest 1 View (Final result)  Result time 11/29/22 11:55:32      Final result by Palma Lara MD (11/29/22 11:55:32)                   Impression:      No acute cardiopulmonary disease      Electronically signed by: Palma Lara MD  Date:    11/29/2022  Time:    11:55               Narrative:    EXAMINATION:  XR CHEST 1 VIEW    CLINICAL HISTORY:  L thoracic back pain;    TECHNIQUE:  Single frontal view of the chest was performed.    COMPARISON:  09/07/2021    FINDINGS:  The heart is not enlarged.  The lungs are well expanded and clear.  The costophrenic sulci are sharp..                                  (radiology reading, visualized by me)       Medications   ibuprofen tablet 400 mg (400 mg Oral Given 11/29/22 1048)     Medical Decision Making:   History:   Old Medical Records: I decided to obtain old medical records.  Independently Interpreted Test(s):   I have ordered and independently interpreted EKG Reading(s) - see prior notes  Clinical Tests:   Lab Tests: Ordered and Reviewed  Radiological Study: Ordered and Reviewed  Medical Tests: Ordered and Reviewed        Scribe Attestation:   Scribe #1: I performed the above scribed service and the documentation accurately describes the services I performed. I attest to the accuracy of the note.      ED Course as of 11/29/22 1723   Tue Nov 29, 2022   1102 EKG:  NSR with sinus arrhythmia, rate of 75, normal intervals and axis.  There are no acute ST or T wave changes suggestive of acute ischemia or infarction.   [MR]   1154 CXR:  NAD. (my read) [MR]      ED Course User Index  [MR] Stas Wagoner MD               I, Dr. Stas Wagoner, personally performed the services described in this documentation. All medical record entries made by the scribe were at my direction and in my presence.  I have reviewed the chart and agree that the record reflects my personal performance and is  accurate and complete. Stas Wagoner MD.  5:23 PM 11/29/2022    Joie Kelly is a 47 y.o. female presenting with recurrent, reproducible left thoracic back pain primarily provoked by certain movements and positions. EKG was reviewed with no sign of acute ischemia or infarction.  I doubt ACS.  I do not think further troponin enzyme assay or provocative testing is indicated. Pulmonary embolus is unlikely.  The patient is very low risk with negative Pulmonary Embolism Rule-out Criteria (PERC):  Age less than 50 with no tachycardia, oxygen saturation less than 94%, hemoptysis, recent surgery, history of VTE, or hormone therapy.  Further d-dimer or imaging studies are not indicated.  I have very low suspicion for aortic dissection as well.  Chest x-ray reviewed with no sign of pneumothorax or other acute pulmonary process such as pneumonia.  I do not think other ED diagnostic testing is indicated.  The patient has a nonfocal neurological examination with no red flags.  I doubt acute spinal cord processes requiring further spinal imaging such as CT or MRI.  I doubt epidural abscesses, severe deep space infection, transverse myelitis, discitis, cauda equina syndrome, or other emergent conditions.  Symptomatic treatment as necessary reviewed with outpatient PCP follow-up for reassessment.  Detailed return precautions reviewed as well.    Clinical Impression:   Final diagnoses:  [M54.6] Thoracic back pain      ED Disposition Condition    Discharge Stable          ED Prescriptions    None       Follow-up Information       Follow up With Specialties Details Why Contact [facility]   or your regular PCP, 1 week [address] [place] [address]  [phone]               Stas Wagoner MD  11/29/22 0624

## 2023-01-18 ENCOUNTER — OFFICE VISIT (OUTPATIENT)
Dept: URGENT CARE | Facility: CLINIC | Age: 48
End: 2023-01-18
Payer: MEDICAID

## 2023-01-18 VITALS
SYSTOLIC BLOOD PRESSURE: 123 MMHG | HEIGHT: 62 IN | RESPIRATION RATE: 16 BRPM | WEIGHT: 207 LBS | BODY MASS INDEX: 38.09 KG/M2 | TEMPERATURE: 99 F | OXYGEN SATURATION: 97 % | HEART RATE: 88 BPM | DIASTOLIC BLOOD PRESSURE: 82 MMHG

## 2023-01-18 DIAGNOSIS — Z20.822 COVID-19 VIRUS NOT DETECTED: ICD-10-CM

## 2023-01-18 DIAGNOSIS — Z20.822 EXPOSURE TO COVID-19 VIRUS: Primary | ICD-10-CM

## 2023-01-18 LAB
CTP QC/QA: YES
SARS-COV-2 AG RESP QL IA.RAPID: NEGATIVE

## 2023-01-18 PROCEDURE — 99213 PR OFFICE/OUTPT VISIT, EST, LEVL III, 20-29 MIN: ICD-10-PCS | Mod: S$GLB,,, | Performed by: NURSE PRACTITIONER

## 2023-01-18 PROCEDURE — 87811 SARS-COV-2 COVID19 W/OPTIC: CPT | Mod: QW,S$GLB,, | Performed by: NURSE PRACTITIONER

## 2023-01-18 PROCEDURE — 99213 OFFICE O/P EST LOW 20 MIN: CPT | Mod: S$GLB,,, | Performed by: NURSE PRACTITIONER

## 2023-01-18 PROCEDURE — 87811 SARS CORONAVIRUS 2 ANTIGEN POCT, MANUAL READ: ICD-10-PCS | Mod: QW,S$GLB,, | Performed by: NURSE PRACTITIONER

## 2023-01-18 NOTE — PROGRESS NOTES
"Subjective:       Patient ID: Joie Kelly is a 47 y.o. female.    Vitals:  height is 5' 2" (1.575 m) and weight is 93.9 kg (207 lb). Her temperature is 98.7 °F (37.1 °C). Her blood pressure is 123/82 and her pulse is 88. Her respiration is 16 and oxygen saturation is 97%.     Chief Complaint: covid exposure (No symptoms)    Covid exposure.  Denies symptoms.       Constitution: Negative for chills, fatigue and fever.   HENT:  Negative for congestion and sore throat.    Cardiovascular:  Negative for chest pain.   Respiratory:  Negative for cough.    Gastrointestinal:  Negative for abdominal pain, nausea, vomiting and diarrhea.   Neurological:  Negative for headaches.     Objective:      Physical Exam   Constitutional: She is oriented to person, place, and time. She appears well-developed.  Non-toxic appearance. She does not appear ill. No distress.   HENT:   Head: Normocephalic and atraumatic.   Nose: Nose normal.   Mouth/Throat: Oropharynx is clear and moist. Mucous membranes are moist.   Eyes: Conjunctivae and EOM are normal.   Cardiovascular: Normal rate, regular rhythm and normal heart sounds.   Pulmonary/Chest: Effort normal and breath sounds normal. No respiratory distress. She has no wheezes. She has no rhonchi. She has no rales.   Abdominal: Normal appearance.   Neurological: no focal deficit. She is alert and oriented to person, place, and time.   Skin: Skin is warm and dry. Capillary refill takes 2 to 3 seconds.   Psychiatric: Her behavior is normal. Mood normal.   Nursing note and vitals reviewed.      Assessment:       1. Exposure to COVID-19 virus    2. COVID-19 virus not detected          Plan:         Exposure to COVID-19 virus  -     SARS Coronavirus 2 Antigen, POCT Manual Read    COVID-19 virus not detected                     "

## 2023-07-12 ENCOUNTER — HOSPITAL ENCOUNTER (OUTPATIENT)
Dept: RADIOLOGY | Facility: HOSPITAL | Age: 48
Discharge: HOME | End: 2023-07-12
Attending: NURSE PRACTITIONER
Payer: MEDICAID

## 2023-07-12 DIAGNOSIS — Z11.1 ENCOUNTER FOR SCREENING FOR RESPIRATORY TUBERCULOSIS: Primary | ICD-10-CM

## 2023-07-12 DIAGNOSIS — Z11.1 ENCOUNTER FOR SCREENING FOR RESPIRATORY TUBERCULOSIS: ICD-10-CM

## 2023-07-12 DIAGNOSIS — Z01.818 ENCOUNTER FOR OTHER PREPROCEDURAL EXAMINATION: Primary | ICD-10-CM

## 2023-07-12 PROCEDURE — 71046 X-RAY EXAM CHEST 2 VIEWS: CPT | Mod: TC,PO

## 2025-04-17 ENCOUNTER — OFFICE VISIT (OUTPATIENT)
Dept: INFECTIOUS DISEASES | Facility: CLINIC | Age: 50
End: 2025-04-17

## 2025-04-17 ENCOUNTER — CLINICAL SUPPORT (OUTPATIENT)
Dept: INFECTIOUS DISEASES | Facility: CLINIC | Age: 50
End: 2025-04-17

## 2025-04-17 VITALS
BODY MASS INDEX: 34.52 KG/M2 | WEIGHT: 188.69 LBS | HEART RATE: 64 BPM | SYSTOLIC BLOOD PRESSURE: 124 MMHG | TEMPERATURE: 98 F | DIASTOLIC BLOOD PRESSURE: 79 MMHG

## 2025-04-17 DIAGNOSIS — Z71.84 TRAVEL ADVICE ENCOUNTER: Primary | ICD-10-CM

## 2025-04-17 PROCEDURE — 99999 PR PBB SHADOW E&M-EST. PATIENT-LVL III: CPT | Mod: PBBFAC,,, | Performed by: INTERNAL MEDICINE

## 2025-04-17 RX ORDER — CIPROFLOXACIN 500 MG/1
500 TABLET ORAL EVERY 12 HOURS
Qty: 6 TABLET | Refills: 0 | Status: SHIPPED | OUTPATIENT
Start: 2025-04-17 | End: 2025-04-20

## 2025-04-17 RX ORDER — ATOVAQUONE AND PROGUANIL HYDROCHLORIDE 250; 100 MG/1; MG/1
1 TABLET, FILM COATED ORAL DAILY
Qty: 20 TABLET | Refills: 1 | Status: SHIPPED | OUTPATIENT
Start: 2025-04-17

## 2025-04-17 NOTE — PROGRESS NOTES
Subjective     Patient ID: Joie Kelly is a 49 y.o. female.    Chief Complaint:Travel Consult      History of Present Illness    [place]ing to [place] leaving 4/9 x 8 days.  [place], guided tours.    Immunization history and itinerary reviewed.  Received Hep A and Hep B x 1 at PCP office, advised to finish the series as outlined.          Objective   Physical Exam  Vitals and nursing note reviewed.        Assessment and Plan     1. Travel advice encounter        Plan:  Joie was seen today for travel consult.    Diagnoses and all orders for this visit:    Travel advice encounter  -     typhoid polysaccharide injection Soln 0.5 mL    Other orders  -     ciprofloxacin HCl (CIPRO) 500 MG tablet; Take 1 tablet (500 mg total) by mouth every 12 (twelve) hours. Take as needed for traveler's diarrhea. for 3 days  -     atovaquone-proguaniL (MALARONE) 250-100 mg Tab; Take 1 tablet by mouth once daily. Start 1-2 days before entering malaria area, while traveling and 7 days after leaving malaria area.     ASSESSMENT: Travel     PLAN:     The Patient was provided with an extensive travel guidance packet which provides travel information specific to the patients itinerary.     The patient's medical history was reviewed and the patient was counseled on:  -Dietary precautions.  -Personal protective measures to prevent insect-borne diseases (e.g., malaria, dengue).  -Precautions to prevent exposure to rabies and seek treatment for possible exposures.  -Precautions against sun exposure.  -Precautions against development of DVT during flight.  -Personal and travel safety.    The patient's immunization history was reviewed and, based on the patient's itinerary, immunizations were ordered.    The patient was encouraged to contact us about any problems that may develop after immunization and possible side effects were reviewed.    The patient was instructed to purchase Imodium over the  counter to take in case diarrhea (without blood or fever) develops. An antibiotic was ordered for treatment if severe or bloody diarrhea develops and the patient was instructed on use and possible side effects.     The patient was also instructed to purchase insect repellent containing DEET or [place] and apply according to repellent label instructions.  If indicated by the patients itinerary an anti-malarial agent was prescribed for malaria prophylaxis and possible side effects were reviewed.    The patient was instructed to contact us if problems develop after travel.    I have spent 30 minutes with the patient and more than 50% of the visit was spent counseling regarding travel.

## 2025-04-17 NOTE — PROGRESS NOTES
Pt received Typhoid vaccine IM to left deltoid. Pt tolerated injection well and departed from clinic in [facility].